# Patient Record
Sex: MALE | Race: WHITE | NOT HISPANIC OR LATINO | Employment: FULL TIME | URBAN - METROPOLITAN AREA
[De-identification: names, ages, dates, MRNs, and addresses within clinical notes are randomized per-mention and may not be internally consistent; named-entity substitution may affect disease eponyms.]

---

## 2017-02-28 ENCOUNTER — HOSPITAL ENCOUNTER (OUTPATIENT)
Dept: LAB | Facility: MEDICAL CENTER | Age: 62
End: 2017-02-28
Attending: SURGERY
Payer: COMMERCIAL

## 2017-02-28 LAB
ALBUMIN SERPL BCP-MCNC: 3.9 G/DL (ref 3.2–4.9)
ALBUMIN/GLOB SERPL: 1.1 G/DL
ALP SERPL-CCNC: 85 U/L (ref 30–99)
ALT SERPL-CCNC: 92 U/L (ref 2–50)
ANION GAP SERPL CALC-SCNC: 5 MMOL/L (ref 0–11.9)
AST SERPL-CCNC: 65 U/L (ref 12–45)
BILIRUB SERPL-MCNC: 0.5 MG/DL (ref 0.1–1.5)
BUN SERPL-MCNC: 13 MG/DL (ref 8–22)
CALCIUM SERPL-MCNC: 9.4 MG/DL (ref 8.5–10.5)
CHLORIDE SERPL-SCNC: 108 MMOL/L (ref 96–112)
CO2 SERPL-SCNC: 27 MMOL/L (ref 20–33)
CREAT SERPL-MCNC: 0.77 MG/DL (ref 0.5–1.4)
ERYTHROCYTE [DISTWIDTH] IN BLOOD BY AUTOMATED COUNT: 51.8 FL (ref 35.9–50)
GLOBULIN SER CALC-MCNC: 3.6 G/DL (ref 1.9–3.5)
GLUCOSE SERPL-MCNC: 71 MG/DL (ref 65–99)
HCT VFR BLD AUTO: 47.8 % (ref 42–52)
HGB BLD-MCNC: 15.6 G/DL (ref 14–18)
INR PPP: 1.04 (ref 0.87–1.13)
MCH RBC QN AUTO: 32.6 PG (ref 27–33)
MCHC RBC AUTO-ENTMCNC: 32.6 G/DL (ref 33.7–35.3)
MCV RBC AUTO: 99.8 FL (ref 81.4–97.8)
PLATELET # BLD AUTO: 140 K/UL (ref 164–446)
PMV BLD AUTO: 12.7 FL (ref 9–12.9)
POTASSIUM SERPL-SCNC: 4.3 MMOL/L (ref 3.6–5.5)
PROT SERPL-MCNC: 7.5 G/DL (ref 6–8.2)
PROTHROMBIN TIME: 13.9 SEC (ref 12–14.6)
RBC # BLD AUTO: 4.79 M/UL (ref 4.7–6.1)
SODIUM SERPL-SCNC: 140 MMOL/L (ref 135–145)
WBC # BLD AUTO: 3.5 K/UL (ref 4.8–10.8)

## 2017-02-28 PROCEDURE — 85027 COMPLETE CBC AUTOMATED: CPT

## 2017-02-28 PROCEDURE — 85610 PROTHROMBIN TIME: CPT

## 2017-02-28 PROCEDURE — 36415 COLL VENOUS BLD VENIPUNCTURE: CPT

## 2017-02-28 PROCEDURE — 82105 ALPHA-FETOPROTEIN SERUM: CPT

## 2017-02-28 PROCEDURE — 80053 COMPREHEN METABOLIC PANEL: CPT

## 2017-02-28 PROCEDURE — 86200 CCP ANTIBODY: CPT

## 2017-02-28 PROCEDURE — 82107 ALPHA-FETOPROTEIN L3: CPT

## 2017-03-02 LAB
AFP L3 MFR SERPL: 9.3 % (ref 0–9.9)
AFP SERPL-MCNC: 5 NG/ML (ref 0–15)
AFP-TM SERPL-MCNC: 4 NG/ML (ref 0–9)
CCP IGG SERPL-ACNC: 6 UNITS (ref 0–19)

## 2017-03-05 ENCOUNTER — HOSPITAL ENCOUNTER (OUTPATIENT)
Dept: RADIOLOGY | Facility: MEDICAL CENTER | Age: 62
End: 2017-03-05
Attending: SURGERY
Payer: COMMERCIAL

## 2017-03-05 DIAGNOSIS — C22.9 MALIGNANT NEOPLASM OF LIVER, NOT SPECIFIED AS PRIMARY OR SECONDARY (HCC): ICD-10-CM

## 2017-03-05 PROCEDURE — 700117 HCHG RX CONTRAST REV CODE 255: Performed by: SURGERY

## 2017-03-05 PROCEDURE — 74170 CT ABD WO CNTRST FLWD CNTRST: CPT

## 2017-03-05 RX ADMIN — IOHEXOL 100 ML: 350 INJECTION, SOLUTION INTRAVENOUS at 11:07

## 2017-04-03 ENCOUNTER — APPOINTMENT (OUTPATIENT)
Dept: ADMISSIONS | Facility: MEDICAL CENTER | Age: 62
End: 2017-04-03
Attending: SURGERY
Payer: COMMERCIAL

## 2017-04-13 ENCOUNTER — HOSPITAL ENCOUNTER (OUTPATIENT)
Facility: MEDICAL CENTER | Age: 62
End: 2017-04-13
Attending: SURGERY | Admitting: SURGERY
Payer: COMMERCIAL

## 2017-04-13 VITALS
OXYGEN SATURATION: 96 % | SYSTOLIC BLOOD PRESSURE: 108 MMHG | WEIGHT: 170.86 LBS | HEART RATE: 67 BPM | BODY MASS INDEX: 23.14 KG/M2 | HEIGHT: 72 IN | DIASTOLIC BLOOD PRESSURE: 77 MMHG | TEMPERATURE: 97.3 F | RESPIRATION RATE: 15 BRPM

## 2017-04-13 PROBLEM — C22.9 MALIGNANT NEOPLASM OF LIVER, NOT SPECIFIED AS PRIMARY OR SECONDARY (HCC): Status: ACTIVE | Noted: 2017-04-13

## 2017-04-13 LAB
ALBUMIN SERPL BCP-MCNC: 3.8 G/DL (ref 3.2–4.9)
ALBUMIN/GLOB SERPL: 1.1 G/DL
ALP SERPL-CCNC: 84 U/L (ref 30–99)
ALT SERPL-CCNC: 65 U/L (ref 2–50)
ANION GAP SERPL CALC-SCNC: 7 MMOL/L (ref 0–11.9)
APTT PPP: 28.6 SEC (ref 24.7–36)
AST SERPL-CCNC: 51 U/L (ref 12–45)
BASOPHILS # BLD AUTO: 0.5 % (ref 0–1.8)
BASOPHILS # BLD: 0.02 K/UL (ref 0–0.12)
BILIRUB SERPL-MCNC: 1.2 MG/DL (ref 0.1–1.5)
BUN SERPL-MCNC: 14 MG/DL (ref 8–22)
CALCIUM SERPL-MCNC: 9.7 MG/DL (ref 8.5–10.5)
CHLORIDE SERPL-SCNC: 109 MMOL/L (ref 96–112)
CO2 SERPL-SCNC: 21 MMOL/L (ref 20–33)
CREAT SERPL-MCNC: 0.78 MG/DL (ref 0.5–1.4)
EOSINOPHIL # BLD AUTO: 0.04 K/UL (ref 0–0.51)
EOSINOPHIL NFR BLD: 1 % (ref 0–6.9)
ERYTHROCYTE [DISTWIDTH] IN BLOOD BY AUTOMATED COUNT: 47.1 FL (ref 35.9–50)
GFR SERPL CREATININE-BSD FRML MDRD: >60 ML/MIN/1.73 M 2
GLOBULIN SER CALC-MCNC: 3.6 G/DL (ref 1.9–3.5)
GLUCOSE SERPL-MCNC: 86 MG/DL (ref 65–99)
HCT VFR BLD AUTO: 48 % (ref 42–52)
HGB BLD-MCNC: 16.3 G/DL (ref 14–18)
IMM GRANULOCYTES # BLD AUTO: 0.01 K/UL (ref 0–0.11)
IMM GRANULOCYTES NFR BLD AUTO: 0.2 % (ref 0–0.9)
INR PPP: 1.05 (ref 0.87–1.13)
LYMPHOCYTES # BLD AUTO: 0.72 K/UL (ref 1–4.8)
LYMPHOCYTES NFR BLD: 17.9 % (ref 22–41)
MCH RBC QN AUTO: 32.8 PG (ref 27–33)
MCHC RBC AUTO-ENTMCNC: 34 G/DL (ref 33.7–35.3)
MCV RBC AUTO: 96.6 FL (ref 81.4–97.8)
MONOCYTES # BLD AUTO: 0.48 K/UL (ref 0–0.85)
MONOCYTES NFR BLD AUTO: 11.9 % (ref 0–13.4)
NEUTROPHILS # BLD AUTO: 2.75 K/UL (ref 1.82–7.42)
NEUTROPHILS NFR BLD: 68.5 % (ref 44–72)
NRBC # BLD AUTO: 0 K/UL
NRBC BLD AUTO-RTO: 0 /100 WBC
PLATELET # BLD AUTO: 146 K/UL (ref 164–446)
PMV BLD AUTO: 11 FL (ref 9–12.9)
POTASSIUM SERPL-SCNC: 4.2 MMOL/L (ref 3.6–5.5)
PROT SERPL-MCNC: 7.4 G/DL (ref 6–8.2)
PROTHROMBIN TIME: 14 SEC (ref 12–14.6)
RBC # BLD AUTO: 4.97 M/UL (ref 4.7–6.1)
SODIUM SERPL-SCNC: 137 MMOL/L (ref 135–145)
WBC # BLD AUTO: 4 K/UL (ref 4.8–10.8)

## 2017-04-13 PROCEDURE — 85025 COMPLETE CBC W/AUTO DIFF WBC: CPT

## 2017-04-13 PROCEDURE — A6402 STERILE GAUZE <= 16 SQ IN: HCPCS | Performed by: SURGERY

## 2017-04-13 PROCEDURE — 160036 HCHG PACU - EA ADDL 30 MINS PHASE I: Performed by: SURGERY

## 2017-04-13 PROCEDURE — 501838 HCHG SUTURE GENERAL: Performed by: SURGERY

## 2017-04-13 PROCEDURE — 160048 HCHG OR STATISTICAL LEVEL 1-5: Performed by: SURGERY

## 2017-04-13 PROCEDURE — 110382 HCHG SHELL REV 271: Performed by: SURGERY

## 2017-04-13 PROCEDURE — 110371 HCHG SHELL REV 272: Performed by: SURGERY

## 2017-04-13 PROCEDURE — 160035 HCHG PACU - 1ST 60 MINS PHASE I: Performed by: SURGERY

## 2017-04-13 PROCEDURE — 700111 HCHG RX REV CODE 636 W/ 250 OVERRIDE (IP)

## 2017-04-13 PROCEDURE — 160002 HCHG RECOVERY MINUTES (STAT): Performed by: SURGERY

## 2017-04-13 PROCEDURE — 501445 HCHG STAPLER, SKIN DISP: Performed by: SURGERY

## 2017-04-13 PROCEDURE — A4606 OXYGEN PROBE USED W OXIMETER: HCPCS | Performed by: SURGERY

## 2017-04-13 PROCEDURE — 80053 COMPREHEN METABOLIC PANEL: CPT

## 2017-04-13 PROCEDURE — 160009 HCHG ANES TIME/MIN: Performed by: SURGERY

## 2017-04-13 PROCEDURE — 160046 HCHG PACU - 1ST 60 MINS PHASE II: Performed by: SURGERY

## 2017-04-13 PROCEDURE — 700102 HCHG RX REV CODE 250 W/ 637 OVERRIDE(OP)

## 2017-04-13 PROCEDURE — 502240 HCHG MISC OR SUPPLY RC 0272: Performed by: SURGERY

## 2017-04-13 PROCEDURE — A9270 NON-COVERED ITEM OR SERVICE: HCPCS

## 2017-04-13 PROCEDURE — 160029 HCHG SURGERY MINUTES - 1ST 30 MINS LEVEL 4: Performed by: SURGERY

## 2017-04-13 PROCEDURE — 85610 PROTHROMBIN TIME: CPT

## 2017-04-13 PROCEDURE — 501570 HCHG TROCAR, SEPARATOR: Performed by: SURGERY

## 2017-04-13 PROCEDURE — 160047 HCHG PACU  - EA ADDL 30 MINS PHASE II: Performed by: SURGERY

## 2017-04-13 PROCEDURE — 160025 RECOVERY II MINUTES (STATS): Performed by: SURGERY

## 2017-04-13 PROCEDURE — 85730 THROMBOPLASTIN TIME PARTIAL: CPT

## 2017-04-13 PROCEDURE — 700101 HCHG RX REV CODE 250

## 2017-04-13 PROCEDURE — 502571 HCHG PACK, LAP CHOLE: Performed by: SURGERY

## 2017-04-13 PROCEDURE — 160041 HCHG SURGERY MINUTES - EA ADDL 1 MIN LEVEL 4: Performed by: SURGERY

## 2017-04-13 PROCEDURE — 501571 HCHG TROCAR, SEPARATOR 12X100: Performed by: SURGERY

## 2017-04-13 RX ORDER — ONDANSETRON 4 MG/1
4 TABLET, FILM COATED ORAL EVERY 4 HOURS PRN
Qty: 20 TAB | Refills: 1 | Status: SHIPPED | OUTPATIENT
Start: 2017-04-13 | End: 2017-04-13

## 2017-04-13 RX ORDER — TRAMADOL HYDROCHLORIDE 50 MG/1
50-100 TABLET ORAL EVERY 4 HOURS PRN
Qty: 30 TAB | Refills: 0 | Status: SHIPPED | OUTPATIENT
Start: 2017-04-13 | End: 2017-08-07

## 2017-04-13 RX ORDER — ONDANSETRON 4 MG/1
4 TABLET, FILM COATED ORAL EVERY 4 HOURS PRN
Qty: 20 TAB | Refills: 1 | Status: SHIPPED | OUTPATIENT
Start: 2017-04-13 | End: 2017-08-07

## 2017-04-13 RX ORDER — MAGNESIUM HYDROXIDE 1200 MG/15ML
LIQUID ORAL
Status: DISCONTINUED | OUTPATIENT
Start: 2017-04-13 | End: 2017-04-13 | Stop reason: HOSPADM

## 2017-04-13 RX ORDER — TRAMADOL HYDROCHLORIDE 50 MG/1
50-100 TABLET ORAL EVERY 4 HOURS PRN
Qty: 30 TAB | Refills: 0 | Status: SHIPPED | OUTPATIENT
Start: 2017-04-13 | End: 2017-04-13

## 2017-04-13 RX ORDER — KETOROLAC TROMETHAMINE 10 MG/1
10 TABLET, FILM COATED ORAL EVERY 6 HOURS PRN
Qty: 30 TAB | Refills: 1 | Status: SHIPPED | OUTPATIENT
Start: 2017-04-13 | End: 2017-08-07

## 2017-04-13 RX ORDER — OXYCODONE HCL 5 MG/5 ML
SOLUTION, ORAL ORAL
Status: COMPLETED
Start: 2017-04-13 | End: 2017-04-13

## 2017-04-13 RX ORDER — IBUPROFEN 200 MG
400-600 TABLET ORAL EVERY 6 HOURS PRN
Status: ON HOLD | COMMUNITY
End: 2017-04-13

## 2017-04-13 RX ORDER — KETOROLAC TROMETHAMINE 10 MG/1
10 TABLET, FILM COATED ORAL EVERY 6 HOURS PRN
Qty: 30 TAB | Refills: 1 | Status: SHIPPED | OUTPATIENT
Start: 2017-04-13 | End: 2017-04-13

## 2017-04-13 RX ORDER — SODIUM CHLORIDE, SODIUM LACTATE, POTASSIUM CHLORIDE, CALCIUM CHLORIDE 600; 310; 30; 20 MG/100ML; MG/100ML; MG/100ML; MG/100ML
1000 INJECTION, SOLUTION INTRAVENOUS
Status: COMPLETED | OUTPATIENT
Start: 2017-04-13 | End: 2017-04-13

## 2017-04-13 RX ORDER — TRAMADOL HYDROCHLORIDE 50 MG/1
50 TABLET ORAL EVERY 4 HOURS PRN
Qty: 30 TAB | Refills: 0 | Status: SHIPPED | OUTPATIENT
Start: 2017-04-13 | End: 2017-04-13

## 2017-04-13 RX ORDER — BUPIVACAINE HYDROCHLORIDE AND EPINEPHRINE 5; 5 MG/ML; UG/ML
INJECTION, SOLUTION EPIDURAL; INTRACAUDAL; PERINEURAL
Status: DISCONTINUED | OUTPATIENT
Start: 2017-04-13 | End: 2017-04-13 | Stop reason: HOSPADM

## 2017-04-13 RX ORDER — HYDROMORPHONE HYDROCHLORIDE 2 MG/ML
INJECTION, SOLUTION INTRAMUSCULAR; INTRAVENOUS; SUBCUTANEOUS
Status: COMPLETED
Start: 2017-04-13 | End: 2017-04-13

## 2017-04-13 RX ADMIN — FENTANYL CITRATE 25 MCG: 50 INJECTION, SOLUTION INTRAMUSCULAR; INTRAVENOUS at 08:20

## 2017-04-13 RX ADMIN — FENTANYL CITRATE 50 MCG: 50 INJECTION, SOLUTION INTRAMUSCULAR; INTRAVENOUS at 08:10

## 2017-04-13 RX ADMIN — OXYCODONE HYDROCHLORIDE 5 MG: 5 SOLUTION ORAL at 08:30

## 2017-04-13 RX ADMIN — SODIUM CHLORIDE, SODIUM LACTATE, POTASSIUM CHLORIDE, CALCIUM CHLORIDE 1000 ML: 600; 310; 30; 20 INJECTION, SOLUTION INTRAVENOUS at 06:04

## 2017-04-13 ASSESSMENT — PAIN SCALES - GENERAL
PAINLEVEL_OUTOF10: 2
PAINLEVEL_OUTOF10: 2
PAINLEVEL_OUTOF10: 3
PAINLEVEL_OUTOF10: 3
PAINLEVEL_OUTOF10: 0
PAINLEVEL_OUTOF10: 3

## 2017-04-13 NOTE — IP AVS SNAPSHOT
Home Care Instructions                                                                                                                Name:Mauricio Fraire Brandamore  Medical Record Number:[id number]  CSN: [id number]    YOB: 1955   Age: 62 y.o.  Sex: male  HT:1.829 m (6') WT: 77.5 kg (170 lb 13.7 oz)          Admit Date: 4/13/2017     Discharge Date:   Today's Date: 4/13/2017  Attending Doctor:  Ba Coleman,*                  Allergies:  Review of patient's allergies indicates no known allergies.              Follow-up Information     1. Follow up with Ba Coleman M.D. In 4 weeks.    Specialties:  Surgery, Radiology    Contact information    [id number]  [facility] [address] [phone]          Discharge Instructions         ACTIVITY: Rest and take it easy for the first 24 hours.  A responsible adult is recommended to remain with you during that time.  It is normal to feel sleepy.  We encourage you to not do anything that requires balance, judgment or coordination.    MILD FLU-LIKE SYMPTOMS ARE NORMAL. YOU MAY EXPERIENCE GENERALIZED MUSCLE ACHES, THROAT IRRITATION, HEADACHE AND/OR SOME NAUSEA.    FOR 24 HOURS DO NOT:  Drive, operate machinery or run household appliances.  Drink beer or alcoholic beverages.   Make important decisions or sign legal documents.    SPECIAL INSTRUCTIONS:   Follow up with DR. Coleman in 4 weeks after repeat CT of liver  Follow up with office MA in 14 days for staple removal    DIET: To avoid nausea, slowly advance diet as tolerated, avoiding spicy or greasy foods for the first day.  Add more substantial food to your diet according to your physician's instructions.  INCREASE FLUIDS AND FIBER TO AVOID CONSTIPATION.    SURGICAL DRESSING/BATHING:   OK to shower in AM with dressings on  Remove dressing in 6 days    FOLLOW-UP APPOINTMENT:  A follow-up appointment should be arranged with your doctor; call to schedule.    You should CALL YOUR PHYSICIAN if  you develop:  Fever greater than 101 degrees F.  Pain not relieved by medication, or persistent nausea or vomiting.  Excessive bleeding (blood soaking through dressing) or unexpected drainage from the wound.  Extreme redness or swelling around the incision site, drainage of pus or foul smelling drainage.  Inability to urinate or empty your bladder within 8 hours.  Problems with breathing or chest pain.    You should call 911 if you develop problems with breathing or chest pain.  If you are unable to contact your doctor or surgical center, you should go to the nearest emergency room or urgent care center.  Physician's telephone #: [phone]    If any questions arise, call your doctor.  If your doctor is not available, please feel free to call the Surgical Center at [phone].  The Center is open Monday through Friday from 7AM to 7PM.  You can also call the [employer] Live Chat HOTLINE open 24 hours/day, 7 days/week and speak to a nurse at [phone], or toll free at [phone].    A registered nurse may call you a few days after your surgery to see how you are doing after your procedure.    MEDICATIONS: Resume taking daily medication.  Take prescribed pain medication with food.  If no medication is prescribed, you may take non-aspirin pain medication if needed.  PAIN MEDICATION CAN BE VERY CONSTIPATING.  Take a stool softener or laxative such as senokot, pericolace, or milk of magnesia if needed.    Prescription given for Zofran, Ultram, and Toradol.  Last pain medication given at 8:30 am.    If your physician has prescribed pain medication that includes Acetaminophen (Tylenol), do not take additional Acetaminophen (Tylenol) while taking the prescribed medication.    Depression / Suicide Risk    As you are discharged from this [facility] facility, it is important to learn how to keep safe from harming yourself.    Recognize the warning signs:  · Abrupt changes in personality, positive or negative- including  increase in energy   · Giving away possessions  · Change in eating patterns- significant weight changes-  positive or negative  · Change in sleeping patterns- unable to sleep or sleeping all the time   · Unwillingness or inability to communicate  · Depression  · Unusual sadness, discouragement and loneliness  · Talk of wanting to die  · Neglect of personal appearance   · Rebelliousness- reckless behavior  · Withdrawal from people/activities they love  · Confusion- inability to concentrate     If you or a loved one observes any of these behaviors or has concerns about self-harm, here's what you can do:  · Talk about it- your feelings and reasons for harming yourself  · Remove any means that you might use to hurt yourself (examples: pills, rope, extension cords, firearm)  · Get professional help from the community (Mental Health, Substance Abuse, psychological counseling)  · Do not be alone:Call your Safe Contact- someone whom you trust who will be there for you.  · Call your local CRISIS HOTLINE 933-8136 or 677-298-6900  · Call your local Children's Mobile Crisis Response Team [place] (854) 031-1207 or [employer] [place] [employer]  · Call the toll free National Suicide Prevention Hotlines   · National Suicide Prevention Lifeline 195-943-WSEJ (2805)  · National Hope Line Network 800-SUICIDE (810-7620)       Medication List      START taking these medications        Instructions    Morning Afternoon Evening Bedtime    ketorolac 10 MG Tabs   Commonly known as:  TORADOL        Take 1 Tab by mouth every 6 hours as needed for Mild Pain.   Dose:  10 mg                        ondansetron 4 MG Tabs tablet   Commonly known as:  ZOFRAN        Take 1 Tab by mouth every four hours as needed for Nausea/Vomiting.   Dose:  4 mg                        tramadol 50 MG Tabs   Commonly known as:  ULTRAM        Take 1-2 Tabs by mouth every four hours as needed.   Dose:   mg                          STOP taking these medications      ibuprofen 200 MG Tabs   Commonly known as:  MOTRIN                    Where to Get Your Medications      You can get these medications from any pharmacy     Bring a paper prescription for each of these medications    - ketorolac 10 MG Tabs  - ondansetron 4 MG Tabs tablet  - tramadol 50 MG Tabs            Medication Information     Above and/or attached are the medications your physician expects you to take upon discharge. Review all of your home medications and newly ordered medications with your doctor and/or pharmacist. Follow medication instructions as directed by your doctor and/or pharmacist. Please keep your medication list with you and share with your physician. Update the information when medications are discontinued, doses are changed, or new medications (including over-the-counter products) are added; and carry medication information at all times in the event of emergency situations.        Resources     Quit Smoking / Tobacco Use:    I understand the use of any tobacco products increases my chance of suffering from future heart disease or stroke and could cause other illnesses which may shorten my life. Quitting the use of tobacco products is the single most important thing I can do to improve my health. For further information on smoking / tobacco cessation call a Toll Free Quit Line at 1-685.313.9697 (*National [employer]) or 1-667.127.7249 (American Lung Association) or you can access the web based program at www.lungusa.org.    [place] Tobacco Users Help Line:  (828) 538-6743       Toll Free: 1-153.290.6085  Quit Tobacco Program [facility] (210)702-4406    Crisis Hotline:    [place] Crisis Hotline:  6-935-VGHJVWQ or 1-616.760.3386    [place] Crisis Hotline:    1-749.870.3306 or 472-356-8382    Discharge Survey:   Thank you for choosing [facility]. We hope we did everything we could to make your hospital stay a pleasant one. You may be receiving a survey and we would  appreciate your time and participation in answering the questions. Your input is very valuable to us in our efforts to improve our service to our patients and their families.            Signatures     My signature on this form indicates that:    1. I acknowledge receipt and understanding of these Home Care Instruction.  2. My questions regarding this information have been answered to my satisfaction.  3. I have formulated a plan with my discharge nurse to obtain my prescribed medications for home.    __________________________________      __________________________________                   Patient Signature                                 Guardian/Responsible Adult Signature      __________________________________                 __________       ________                       Nurse Signature                                               Date                 Time

## 2017-04-13 NOTE — OR NURSING
PRE OP COMPLETED, IV ESTABLISHED, REVIEWED WHAT TO EXPECT DURING SURG AND AFTER, LABS DRAWN AND SENT TO LAB

## 2017-04-13 NOTE — DISCHARGE INSTRUCTIONS
ACTIVITY: Rest and take it easy for the first 24 hours.  A responsible adult is recommended to remain with you during that time.  It is normal to feel sleepy.  We encourage you to not do anything that requires balance, judgment or coordination.    MILD FLU-LIKE SYMPTOMS ARE NORMAL. YOU MAY EXPERIENCE GENERALIZED MUSCLE ACHES, THROAT IRRITATION, HEADACHE AND/OR SOME NAUSEA.    FOR 24 HOURS DO NOT:  Drive, operate machinery or run household appliances.  Drink beer or alcoholic beverages.   Make important decisions or sign legal documents.    SPECIAL INSTRUCTIONS:   Follow up with DR. Coleman in 4 weeks after repeat CT of liver  Follow up with office MA in 14 days for staple removal    DIET: To avoid nausea, slowly advance diet as tolerated, avoiding spicy or greasy foods for the first day.  Add more substantial food to your diet according to your physician's instructions.  INCREASE FLUIDS AND FIBER TO AVOID CONSTIPATION.    SURGICAL DRESSING/BATHING:   OK to shower in AM with dressings on  Remove dressing in 6 days    FOLLOW-UP APPOINTMENT:  A follow-up appointment should be arranged with your doctor; call to schedule.    You should CALL YOUR PHYSICIAN if you develop:  Fever greater than 101 degrees F.  Pain not relieved by medication, or persistent nausea or vomiting.  Excessive bleeding (blood soaking through dressing) or unexpected drainage from the wound.  Extreme redness or swelling around the incision site, drainage of pus or foul smelling drainage.  Inability to urinate or empty your bladder within 8 hours.  Problems with breathing or chest pain.    You should call 911 if you develop problems with breathing or chest pain.  If you are unable to contact your doctor or surgical center, you should go to the nearest emergency room or urgent care center.  Physician's telephone #: [phone]    If any questions arise, call your doctor.  If your doctor is not available, please feel free to call the Surgical  Center at [phone].  The Center is open Monday through Friday from 7AM to 7PM.  You can also call the HEALTH HOTLINE open 24 hours/day, 7 days/week and speak to a nurse at [phone], or toll free at [phone].    A registered nurse may call you a few days after your surgery to see how you are doing after your procedure.    MEDICATIONS: Resume taking daily medication.  Take prescribed pain medication with food.  If no medication is prescribed, you may take non-aspirin pain medication if needed.  PAIN MEDICATION CAN BE VERY CONSTIPATING.  Take a stool softener or laxative such as senokot, pericolace, or milk of magnesia if needed.    Prescription given for Zofran, Ultram, and Toradol.  Last pain medication given at 8:30 am.    If your physician has prescribed pain medication that includes Acetaminophen (Tylenol), do not take additional Acetaminophen (Tylenol) while taking the prescribed medication.    Depression / Suicide Risk    As you are discharged from this [facility] facility, it is important to learn how to keep safe from harming yourself.    Recognize the warning signs:  · Abrupt changes in personality, positive or negative- including increase in energy   · Giving away possessions  · Change in eating patterns- significant weight changes-  positive or negative  · Change in sleeping patterns- unable to sleep or sleeping all the time   · Unwillingness or inability to communicate  · Depression  · Unusual sadness, discouragement and loneliness  · Talk of wanting to die  · Neglect of personal appearance   · Rebelliousness- reckless behavior  · Withdrawal from people/activities they love  · Confusion- inability to concentrate     If you or a loved one observes any of these behaviors or has concerns about self-harm, here's what you can do:  · Talk about it- your feelings and reasons for harming yourself  · Remove any means that you might use to hurt yourself (examples: pills, rope, extension cords,  firearm)  · Get professional help from the community (Mental Health, Substance Abuse, psychological counseling)  · Do not be alone:Call your Safe Contact- someone whom you trust who will be there for you.  · Call your local CRISIS HOTLINE 301-1215 or 735-666-0863  · Call your local Children's Mobile Crisis Response Team [place] (362) 077-0544 or www.[employer]  · Call the toll free National Suicide Prevention Hotlines   · National Suicide Prevention Lifeline 441-238-DVRM (5565)  · National Hope Line Network 800-SUICIDE (927-1977)

## 2017-04-13 NOTE — IP AVS SNAPSHOT
4/13/2017    Mauricio Fraire [place]  [address] [address]    Dear Maruicio:    [facility] wants to ensure your discharge home is safe and you or your loved ones have had all of your questions answered regarding your care after you leave the hospital.    Below is a list of resources and contact information should you have any questions regarding your hospital stay, follow-up instructions, or active medical symptoms.    Questions or Concerns Regarding… Contact   Medical Questions Related to Your Discharge  (7 days a week, 8am-5pm) Contact a Nurse Care Coordinator   [phone]   Medical Questions Not Related to Your Discharge  (24 hours a day / 7 days a week)  Contact the Nurse Health Line   [phone]    Medications or Discharge Instructions Refer to your discharge packet   or contact your [facility] Primary Care Provider   [phone]   Follow-up Appointment(s) Schedule your appointment via [employer]   or contact Scheduling [phone]   Billing Review your statement via [employer]  or contact Billing [phone]   Medical Records Review your records via [employer]   or contact Medical Records [phone]     You may receive a telephone call within two days of discharge. This call is to make certain you understand your discharge instructions and have the opportunity to have any questions answered. You can also easily access your medical information, test results and upcoming appointments via the [employer] free online health management tool. You can learn more and sign up at [employer]. For assistance setting up your [employer] account, please call [phone].    Once again, we want to ensure your discharge home is safe and that you have a clear understanding of any next steps in your care. If you have any questions or concerns, please do not hesitate to contact us, we are here for you. Thank you for choosing [facility] for your healthcare needs.    Sincerely,    Your [facility]  Healthcare Team

## 2017-04-13 NOTE — OP REPORT
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  Patient has been followed closely with surveillance and on recent   evaluation, he was found to have a new lesion in segment 6.  After long   discussion with the patient given risks, benefits, alternatives, he elected to   go ahead and proceed with repeat laparoscopic microwave thermal ablation.    SURGEON:  Ba Coleman MD.    ASSISTANT:  None.    ANESTHESIA:  General plus local anesthetic.    PROCEDURES DONE:  1.  Laparoscopic microwave thermal ablation of segment 5 lesion.  2.  Repair of umbilical hernia.    ESTIMATED BLOOD LOSS:  5 mL    COMPLICATIONS:  No complications.    DESCRIPTION OF PROCEDURE:  Patient was brought to OR, placed under general   anesthetic, prepped with chlorhexidine prep, covered with sterile drapes,   given preoperative antibiotics.  Time-out was adequate.  At that point, he was   given 5 mL of 0.5% Marcaine on the right lateral aspect of his umbilicus in   his previous laparoscopic incision.  An 11 blade was used to incise the skin   and Bovie electrocautery was used upon entering the abdomen.  Patient was   found to have an umbilical hernia.  The umbilical hernia was taken down with   Metzenbaum scissors.  Fascia was elevated and it was reduced into the abdomen.    We decided to use the same site for his port.  A 0 Vicryl suture was placed   on each side of the fascia in order to repair his ventral hernia, but also   hold a Gordo trocar.  Upon entering the abdomen, it was insufflated to 15 mm   of pressure.  He was placed in a reverse Trendelenburg, left side down.  His   old 5 mm port site was used again after given local anesthetic and the port   was placed under direct vision atraumatically.  At that point, we did   intraoperative ultrasound, we easily identified the surface  lesion that was   identified and _____ was found on CT scan.  Patient also showed a small liver,   but some macronodular and micronodular regenerative changes.  At that point,   the previous ablation site was evaluated with laparoscopic ultrasound, found   to be intact.  We then evaluated the tumor and measured approximately 3 cm in   size, which was identical to what was seen on imaging.  At that point, we made   a stab wound in the lung to the right of the midline in the subcostal margin.    After given local anesthetic, a laparoscopic Microwave thermal ablation was   inserted and we did 3 overlapping ablations in order to encompass the whole   tumor.  We started at the deep and worked our way anterior and lateral.  We   initially started with 100 man at 4 minutes ablation giving us a 3.6x4.1   followed by a 1-minute track ablation, we then proceeded with a second 100   man at 4 minutes giving us a 3.6x4.1 again and then a third 100 man at 4   minutes giving _____ overlapping, each was somewhere between 30 seconds and 1   minute track ablations.  Once completed, reinspection with the ultrasound, so   that we could not identify the tumor.  There was no bleeding.  We then placed   patient in the supine position.  At that point, we then proceeded with placing   in a supine position, desufflated the abdomen, waiting 3 minutes by the   clock, insufflated and reinspected showed no bile or bleeding.  So at that   point, the operation was completed.  The rest of the liver had been surveyed   and found to have no other tumors.  At that point, the operation was   completed.  The abdomen was desufflated.  All the trocars were removed under   direct vision, 0 Vicryl suture was tied in the umbilicus.  He was given   another 10 mL of 0.5% Marcaine with epinephrine throughout the area of the   umbilicus.  Skin was closed with staples, covered with 2x2, Tegaderm,   extubated, and transferred to recovery room.        ____________________________________     MD ALEJA DRAKE / YESENIA    DD:  04/13/2017 08:10:53  DT:  04/13/2017 08:56:30    D#:  [id number]  Job#:  [id number]

## 2017-04-13 NOTE — PROGRESS NOTES
The Medication Reconciliation process has been completed by interviewing the patient    Allergies have been reviewed  Antibiotic use in 30 days - NONE    Home Pharmacy:  NONE

## 2017-06-11 ENCOUNTER — HOSPITAL ENCOUNTER (OUTPATIENT)
Dept: RADIOLOGY | Facility: MEDICAL CENTER | Age: 62
End: 2017-06-11
Attending: SURGERY
Payer: COMMERCIAL

## 2017-06-11 DIAGNOSIS — C22.9 MALIGNANT NEOPLASM OF LIVER, NOT SPECIFIED AS PRIMARY OR SECONDARY (HCC): ICD-10-CM

## 2017-06-19 ENCOUNTER — HOSPITAL ENCOUNTER (OUTPATIENT)
Dept: RADIOLOGY | Facility: MEDICAL CENTER | Age: 62
End: 2017-06-19
Attending: SURGERY
Payer: COMMERCIAL

## 2017-06-19 PROCEDURE — 74170 CT ABD WO CNTRST FLWD CNTRST: CPT

## 2017-06-19 PROCEDURE — 700117 HCHG RX CONTRAST REV CODE 255: Performed by: SURGERY

## 2017-06-19 RX ADMIN — IOHEXOL 100 ML: 350 INJECTION, SOLUTION INTRAVENOUS at 13:04

## 2017-07-17 ENCOUNTER — HOSPITAL ENCOUNTER (OUTPATIENT)
Dept: LAB | Facility: MEDICAL CENTER | Age: 62
End: 2017-07-17
Attending: NURSE PRACTITIONER
Payer: COMMERCIAL

## 2017-07-17 DIAGNOSIS — K74.60 CIRRHOSIS OF LIVER WITHOUT ASCITES, UNSPECIFIED HEPATIC CIRRHOSIS TYPE (HCC): ICD-10-CM

## 2017-07-17 DIAGNOSIS — C22.0 HEPATOMA (HCC): ICD-10-CM

## 2017-07-17 LAB
ALBUMIN SERPL BCP-MCNC: 3.9 G/DL (ref 3.2–4.9)
ALBUMIN/GLOB SERPL: 1.3 G/DL
ALP SERPL-CCNC: 103 U/L (ref 30–99)
ALT SERPL-CCNC: 66 U/L (ref 2–50)
ANION GAP SERPL CALC-SCNC: 8 MMOL/L (ref 0–11.9)
AST SERPL-CCNC: 58 U/L (ref 12–45)
BASOPHILS # BLD AUTO: 0.4 % (ref 0–1.8)
BASOPHILS # BLD: 0.02 K/UL (ref 0–0.12)
BILIRUB SERPL-MCNC: 0.7 MG/DL (ref 0.1–1.5)
BUN SERPL-MCNC: 10 MG/DL (ref 8–22)
CALCIUM SERPL-MCNC: 9.8 MG/DL (ref 8.5–10.5)
CHLORIDE SERPL-SCNC: 107 MMOL/L (ref 96–112)
CO2 SERPL-SCNC: 25 MMOL/L (ref 20–33)
CREAT SERPL-MCNC: 0.76 MG/DL (ref 0.5–1.4)
EOSINOPHIL # BLD AUTO: 0.04 K/UL (ref 0–0.51)
EOSINOPHIL NFR BLD: 0.9 % (ref 0–6.9)
ERYTHROCYTE [DISTWIDTH] IN BLOOD BY AUTOMATED COUNT: 49.6 FL (ref 35.9–50)
GFR SERPL CREATININE-BSD FRML MDRD: >60 ML/MIN/1.73 M 2
GLOBULIN SER CALC-MCNC: 3.1 G/DL (ref 1.9–3.5)
GLUCOSE SERPL-MCNC: 97 MG/DL (ref 65–99)
HCT VFR BLD AUTO: 44.8 % (ref 42–52)
HGB BLD-MCNC: 14.9 G/DL (ref 14–18)
IMM GRANULOCYTES # BLD AUTO: 0.01 K/UL (ref 0–0.11)
IMM GRANULOCYTES NFR BLD AUTO: 0.2 % (ref 0–0.9)
INR PPP: 1.02 (ref 0.87–1.13)
LYMPHOCYTES # BLD AUTO: 0.64 K/UL (ref 1–4.8)
LYMPHOCYTES NFR BLD: 14.2 % (ref 22–41)
MCH RBC QN AUTO: 32.5 PG (ref 27–33)
MCHC RBC AUTO-ENTMCNC: 33.3 G/DL (ref 33.7–35.3)
MCV RBC AUTO: 97.8 FL (ref 81.4–97.8)
MONOCYTES # BLD AUTO: 0.52 K/UL (ref 0–0.85)
MONOCYTES NFR BLD AUTO: 11.5 % (ref 0–13.4)
NEUTROPHILS # BLD AUTO: 3.29 K/UL (ref 1.82–7.42)
NEUTROPHILS NFR BLD: 72.8 % (ref 44–72)
NRBC # BLD AUTO: 0 K/UL
NRBC BLD AUTO-RTO: 0 /100 WBC
PLATELET # BLD AUTO: 136 K/UL (ref 164–446)
PMV BLD AUTO: 12 FL (ref 9–12.9)
POTASSIUM SERPL-SCNC: 4.1 MMOL/L (ref 3.6–5.5)
PROT SERPL-MCNC: 7 G/DL (ref 6–8.2)
PROTHROMBIN TIME: 13.7 SEC (ref 12–14.6)
RBC # BLD AUTO: 4.58 M/UL (ref 4.7–6.1)
SODIUM SERPL-SCNC: 140 MMOL/L (ref 135–145)
WBC # BLD AUTO: 4.5 K/UL (ref 4.8–10.8)

## 2017-07-17 PROCEDURE — 85025 COMPLETE CBC W/AUTO DIFF WBC: CPT

## 2017-07-17 PROCEDURE — 83951 ONCOPROTEIN DCP: CPT

## 2017-07-17 PROCEDURE — 36415 COLL VENOUS BLD VENIPUNCTURE: CPT

## 2017-07-17 PROCEDURE — 82107 ALPHA-FETOPROTEIN L3: CPT

## 2017-07-17 PROCEDURE — 80053 COMPREHEN METABOLIC PANEL: CPT

## 2017-07-17 PROCEDURE — 85610 PROTHROMBIN TIME: CPT

## 2017-07-18 ENCOUNTER — HOSPITAL ENCOUNTER (OUTPATIENT)
Dept: RADIOLOGY | Facility: MEDICAL CENTER | Age: 62
End: 2017-07-18
Attending: SURGERY
Payer: COMMERCIAL

## 2017-07-18 DIAGNOSIS — C22.9 MALIGNANT NEOPLASM OF LIVER, NOT SPECIFIED AS PRIMARY OR SECONDARY (HCC): ICD-10-CM

## 2017-07-18 DIAGNOSIS — K74.60 CIRRHOSIS OF LIVER WITHOUT ASCITES, UNSPECIFIED HEPATIC CIRRHOSIS TYPE (HCC): ICD-10-CM

## 2017-07-18 DIAGNOSIS — C22.0 HEPATOMA (HCC): ICD-10-CM

## 2017-07-18 NOTE — CONSULTS
Interventional Oncology Consultation      Re: Mauricio Henderson     MRN: [id number]   : 1955    Mauricio Henderson was referred by Ba Coleman MD. He is a 62 y.o. male seen in clinic for evaluation and possible intervention of unresectable right lobe hepatocellular carcinoma. He does not have any additional treating physicians.    History of Present Illness:   presented to his local ED with acute abdominal pain and right shoulder pain on . 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 He was taken emergently to the OR on  for washout, ablation, and wedge resection of the exophytic portion of the hepatoma. He recovered and subsequently returned to the OR on  for microwave ablation of the remainder of the lesion in segment 5 as well as umbilical hernia repair. He underwent surveillance imaging which showed recurrence around the ablation site in the right lobe. Mr. Henderson is in otherwise good health and is under consideration for right hepatectomy, however he is not currently resectable due to the size of his left hepatic lobe. He denies complaints of fatigue, fever, jaundice, nausea or vomiting, or appetite or bowel changes. He denies ascites or abdominal swelling. He complains of heartburn mainly if he eats too late and then lays down in bed. He works as a  and lives in [place].    He is seen today for review of imaging studies and discussion of possible transarterial therapy with Y90 radioembolization and future portal vein embolization.     Past Medical History   Diagnosis Date   • Patient denies medical problems    • Heart burn    • Dental disorder      uppers     Past Surgical History   Procedure Laterality Date   • Other       esophageal varicies repaired   • Hepatic ablation laparoscopic  2016     Procedure: HEPATIC ABLATION LAPAROSCOPIC. LIVER WEDGE RESECTION;  Surgeon:  Ba Coleman M.D.;  Location: SURGERY [facility];  Service:    • Hepatic ablation laparoscopic Right 4/13/2017     Procedure: HEPATIC ABLATION LAPAROSCOPIC RIGHT LOBE SEGMENT 6 ;  Surgeon: Ba Coleman M.D.;  Location: SURGERY [facility];  Service:      Social History     Social History   • Marital Status: Single     Spouse Name: N/A   • Number of Children: N/A   • Years of Education: N/A     Occupational History   • Not on file.     Social History Main Topics   • Smoking status: Former Smoker -- 1.00 packs/day for 30 years     Types: Cigarettes     Quit date: 01/01/2001   • Smokeless tobacco: Never Used   • Alcohol Use: No   • Drug Use: No   • Sexual Activity: Not on file     Other Topics Concern   • Not on file     Social History Narrative     No family history on file.    Review of Systems:  Constitutional: negative for anorexia, fatigue, fevers and weight loss  Respiratory: negative for dyspnea on exertion  Gastrointestinal: positive for dyspepsia and heartburn, negative for abdominal pain, change in bowel habits, jaundice, melena, nausea and vomiting  Musculoskeletal:negative  Neurological: negative    Labs:      Ref. Range 4/13/2017 06:05 7/17/2017 16:21   WBC Latest Ref Range: 4.8-10.8 K/uL 4.0 (L) 4.5 (L)   RBC Latest Ref Range: 4.70-6.10 M/uL 4.97 4.58 (L)   Hemoglobin Latest Ref Range: 14.0-18.0 g/dL 16.3 14.9   Hematocrit Latest Ref Range: 42.0-52.0 % 48.0 44.8   MCV Latest Ref Range: 81.4-97.8 fL 96.6 97.8   MCH Latest Ref Range: 27.0-33.0 pg 32.8 32.5   MCHC Latest Ref Range: 33.7-35.3 g/dL 34.0 33.3 (L)   RDW Latest Ref Range: 35.9-50.0 fL 47.1 49.6   Platelet Count Latest Ref Range: 164-446 K/uL 146 (L) 136 (L)   MPV Latest Ref Range: 9.0-12.9 fL 11.0 12.0   Neutrophils-Polys Latest Ref Range: 44.00-72.00 % 68.50 72.80 (H)   Neutrophils (Absolute) Latest Ref Range: 1.82-7.42 K/uL 2.75 3.29   Lymphocytes Latest Ref Range: 22.00-41.00 % 17.90 (L) 14.20 (L)   Lymphs  (Absolute) Latest Ref Range: 1.00-4.80 K/uL 0.72 (L) 0.64 (L)   Monocytes Latest Ref Range: 0.00-13.40 % 11.90 11.50   Monos (Absolute) Latest Ref Range: 0.00-0.85 K/uL 0.48 0.52   Eosinophils Latest Ref Range: 0.00-6.90 % 1.00 0.90   Eos (Absolute) Latest Ref Range: 0.00-0.51 K/uL 0.04 0.04   Basophils Latest Ref Range: 0.00-1.80 % 0.50 0.40   Baso (Absolute) Latest Ref Range: 0.00-0.12 K/uL 0.02 0.02   Immature Granulocytes Latest Ref Range: 0.00-0.90 % 0.20 0.20   Immature Granulocytes (abs) Latest Ref Range: 0.00-0.11 K/uL 0.01 0.01   Nucleated RBC Latest Units: /100 WBC 0.00 0.00   NRBC (Absolute) Latest Units: K/uL 0.00 0.00   Sodium Latest Ref Range: 135-145 mmol/L 137 140   Potassium Latest Ref Range: 3.6-5.5 mmol/L 4.2 4.1   Chloride Latest Ref Range:  mmol/L 109 107   Co2 Latest Ref Range: 20-33 mmol/L 21 25   Anion Gap Latest Ref Range: 0.0-11.9  7.0 8.0   Glucose Latest Ref Range: 65-99 mg/dL 86 97   Bun Latest Ref Range: 8-22 mg/dL 14 10   Creatinine Latest Ref Range: 0.50-1.40 mg/dL 0.78 0.76   GFR If  Latest Ref Range: >60 mL/min/1.73 m 2 >60 >60   GFR If Non  Latest Ref Range: >60 mL/min/1.73 m 2 >60 >60   Calcium Latest Ref Range: 8.5-10.5 mg/dL 9.7 9.8   AST(SGOT) Latest Ref Range: 12-45 U/L 51 (H) 58 (H)   ALT(SGPT) Latest Ref Range: 2-50 U/L 65 (H) 66 (H)   Alkaline Phosphatase Latest Ref Range: 30-99 U/L 84 103 (H)   Total Bilirubin Latest Ref Range: 0.1-1.5 mg/dL 1.2 0.7   Albumin Latest Ref Range: 3.2-4.9 g/dL 3.8 3.9   Total Protein Latest Ref Range: 6.0-8.2 g/dL 7.4 7.0   Globulin Latest Ref Range: 1.9-3.5 g/dL 3.6 (H) 3.1   A-G Ratio Latest Units: g/dL 1.1 1.3   PT Latest Ref Range: 12.0-14.6 sec 14.0 13.7   INR Latest Ref Range: 0.87-1.13  1.05 1.02   APTT Latest Ref Range: 24.7-36.0 sec 28.6       Ref. Range 2/28/2017 10:03 2/28/2017 10:03   Alpha Fetoprotein Latest Ref Range: 0-9 ng/mL 5 4   AFP L3% Latest Ref Range: 0.0-9.9 % 9.3       Pathology:  [facility] February 19, 2017:  Hepatic tumor:         Multiple fragmented portions of a moderately differentiated          hepatocellular carcinoma.         The fragments of hepatocellular carcinoma measure 6.5 x 5.0 x          1.5 cm in aggregate dimension. There is focal associated          hemorrhage.         See synoptic report and comment below.    Radiology:   Review of imaging obtained at [facility]:  CT abdomen June 19, 2017:  1.  Interval development of new nodular arterial phase enhancement with washout along the superior, medial and inferior aspects of the previously ablated lesion in the right lobe of the liver. These findings are consistent with residual tumor.  2.  There are at least 2 separate subcentimeter focal areas of arterial enhancement in the dome of the liver suspicious for additional areas of tumor.  3.  Overall the ablation cavity is similar in size.  4.  There is mild splenomegaly again noted.      Current Outpatient Prescriptions   Medication Sig Dispense Refill   • ketorolac (TORADOL) 10 MG Tab Take 1 Tab by mouth every 6 hours as needed for Mild Pain. 30 Tab 1   • ondansetron (ZOFRAN) 4 MG Tab tablet Take 1 Tab by mouth every four hours as needed for Nausea/Vomiting. 20 Tab 1   • tramadol (ULTRAM) 50 MG Tab Take 1-2 Tabs by mouth every four hours as needed. 30 Tab 0     No current facility-administered medications for this encounter.       No Known Allergies    Physical Exam:  General Appearance: healthy, alert, no distress, cooperative  Lungs: negative findings: normal respiratory rate and rhythm  Abdomen: negative findings: no ascites noted  Ambulates greater than 100 feet independently with steady gait.  ECOG Performance Status 0    Impression:   1. Unresectable hepatocellular carcinoma to right hepatic lobe.  2. Cirrhosis.  3. Portal hypertension.  4. Heartburn.    Plan:   Alli Navas MD has reviewed 's history and imaging studies, examined the patient, and  discussed treatment options.  is a candidate for palliative transarterial radioembolization of unresectable right lobe hepatocellular carcinoma. The goal of his therapy is to treat his right lobe hepatocellular carcinoma and then embolize the right portal vein so that he may ultimately have adequate liver reserve for a right hepatectomy in the future. We discussed the method of the procedures at length including angiography and embolization as well as the expected clinical course with the possibility of post-embolization syndrome nausea and pain and hospitalization. We explained that our procedures are palliative and not curative. We discussed the possibility of tumor recurrence and development of future tumors. We additionally discussed the risks, including bleeding and infection, damage to the arteries, reaction to any medications given during the procedure, potential side effects of contrast administration including renal damage, non-target embolization, radiation-induced ulcers or liver disease, liver failure, and death. We discussed alternatives of the procedure including surveillance with no intervention and chemoembolization. The patient verbalizes understanding of risks and alternatives and elects to proceed. All questions were answered. Written pre- and post- procedure care instructions were provided.     The plan is as follows, pending insurance authorization:  · Start proton pump inhibitor.  · Mapping August 8.  · Y90 SIRT right lobe August 15.  · Clinic follow up with repeat labs 10-14 days post Y90.  · Right portal vein embolization approximately 4 weeks after Y90 procedure.  · Imaging and follow up per Dr. Coleman following IR procedures.    ARI Montanez with Alli Navas MD  Interventional Radiology   [facility]   [id number])  [place] [address]  [phone]

## 2017-07-20 LAB — ACARBOXYPROTHROMBIN SERPL-MCNC: 230 NG/ML (ref 0–7.4)

## 2017-07-21 LAB
AFP L3 MFR SERPL: 32.5 % (ref 0–9.9)
AFP SERPL-MCNC: 10 NG/ML (ref 0–15)

## 2017-08-07 ENCOUNTER — APPOINTMENT (OUTPATIENT)
Dept: ADMISSIONS | Facility: MEDICAL CENTER | Age: 62
End: 2017-08-07
Attending: RADIOLOGY
Payer: COMMERCIAL

## 2017-08-07 DIAGNOSIS — Z01.812 PRE-OPERATIVE LABORATORY EXAMINATION: ICD-10-CM

## 2017-08-07 LAB
ALBUMIN SERPL BCP-MCNC: 4 G/DL (ref 3.2–4.9)
ALBUMIN/GLOB SERPL: 1.3 G/DL
ALP SERPL-CCNC: 100 U/L (ref 30–99)
ALT SERPL-CCNC: 86 U/L (ref 2–50)
ANION GAP SERPL CALC-SCNC: 4 MMOL/L (ref 0–11.9)
AST SERPL-CCNC: 70 U/L (ref 12–45)
BASOPHILS # BLD AUTO: 0.4 % (ref 0–1.8)
BASOPHILS # BLD: 0.02 K/UL (ref 0–0.12)
BILIRUB SERPL-MCNC: 0.8 MG/DL (ref 0.1–1.5)
BUN SERPL-MCNC: 12 MG/DL (ref 8–22)
CALCIUM SERPL-MCNC: 9.9 MG/DL (ref 8.5–10.5)
CHLORIDE SERPL-SCNC: 105 MMOL/L (ref 96–112)
CO2 SERPL-SCNC: 27 MMOL/L (ref 20–33)
CREAT SERPL-MCNC: 0.76 MG/DL (ref 0.5–1.4)
EOSINOPHIL # BLD AUTO: 0.05 K/UL (ref 0–0.51)
EOSINOPHIL NFR BLD: 1.1 % (ref 0–6.9)
ERYTHROCYTE [DISTWIDTH] IN BLOOD BY AUTOMATED COUNT: 48.6 FL (ref 35.9–50)
GFR SERPL CREATININE-BSD FRML MDRD: >60 ML/MIN/1.73 M 2
GLOBULIN SER CALC-MCNC: 3.2 G/DL (ref 1.9–3.5)
GLUCOSE SERPL-MCNC: 76 MG/DL (ref 65–99)
HCT VFR BLD AUTO: 45.3 % (ref 42–52)
HGB BLD-MCNC: 15.3 G/DL (ref 14–18)
IMM GRANULOCYTES # BLD AUTO: 0.02 K/UL (ref 0–0.11)
IMM GRANULOCYTES NFR BLD AUTO: 0.4 % (ref 0–0.9)
INR PPP: 1.05 (ref 0.87–1.13)
LYMPHOCYTES # BLD AUTO: 0.72 K/UL (ref 1–4.8)
LYMPHOCYTES NFR BLD: 15.4 % (ref 22–41)
MCH RBC QN AUTO: 33.3 PG (ref 27–33)
MCHC RBC AUTO-ENTMCNC: 33.8 G/DL (ref 33.7–35.3)
MCV RBC AUTO: 98.7 FL (ref 81.4–97.8)
MONOCYTES # BLD AUTO: 0.56 K/UL (ref 0–0.85)
MONOCYTES NFR BLD AUTO: 12 % (ref 0–13.4)
NEUTROPHILS # BLD AUTO: 3.3 K/UL (ref 1.82–7.42)
NEUTROPHILS NFR BLD: 70.7 % (ref 44–72)
NRBC # BLD AUTO: 0 K/UL
NRBC BLD AUTO-RTO: 0 /100 WBC
PLATELET # BLD AUTO: 152 K/UL (ref 164–446)
PMV BLD AUTO: 12 FL (ref 9–12.9)
POTASSIUM SERPL-SCNC: 4.4 MMOL/L (ref 3.6–5.5)
PROT SERPL-MCNC: 7.2 G/DL (ref 6–8.2)
PROTHROMBIN TIME: 14 SEC (ref 12–14.6)
RBC # BLD AUTO: 4.59 M/UL (ref 4.7–6.1)
SODIUM SERPL-SCNC: 136 MMOL/L (ref 135–145)
WBC # BLD AUTO: 4.7 K/UL (ref 4.8–10.8)

## 2017-08-07 PROCEDURE — 85610 PROTHROMBIN TIME: CPT

## 2017-08-07 PROCEDURE — 80053 COMPREHEN METABOLIC PANEL: CPT

## 2017-08-07 PROCEDURE — 85025 COMPLETE CBC W/AUTO DIFF WBC: CPT

## 2017-08-07 PROCEDURE — 36415 COLL VENOUS BLD VENIPUNCTURE: CPT

## 2017-08-08 ENCOUNTER — APPOINTMENT (OUTPATIENT)
Dept: RADIOLOGY | Facility: MEDICAL CENTER | Age: 62
End: 2017-08-08
Attending: RADIOLOGY
Payer: COMMERCIAL

## 2017-08-08 ENCOUNTER — HOSPITAL ENCOUNTER (OUTPATIENT)
Facility: MEDICAL CENTER | Age: 62
End: 2017-08-08
Attending: RADIOLOGY | Admitting: RADIOLOGY
Payer: COMMERCIAL

## 2017-08-08 VITALS
RESPIRATION RATE: 16 BRPM | HEIGHT: 73 IN | DIASTOLIC BLOOD PRESSURE: 64 MMHG | WEIGHT: 160.05 LBS | SYSTOLIC BLOOD PRESSURE: 103 MMHG | OXYGEN SATURATION: 96 % | TEMPERATURE: 98 F | HEART RATE: 86 BPM | BODY MASS INDEX: 21.21 KG/M2

## 2017-08-08 DIAGNOSIS — C22.0 HEPATOMA (HCC): ICD-10-CM

## 2017-08-08 PROCEDURE — 700111 HCHG RX REV CODE 636 W/ 250 OVERRIDE (IP): Performed by: RADIOLOGY

## 2017-08-08 PROCEDURE — 75726 ARTERY X-RAYS ABDOMEN: CPT

## 2017-08-08 PROCEDURE — 99153 MOD SED SAME PHYS/QHP EA: CPT

## 2017-08-08 PROCEDURE — A9540 TC99M MAA: HCPCS

## 2017-08-08 PROCEDURE — 160002 HCHG RECOVERY MINUTES (STAT)

## 2017-08-08 PROCEDURE — 700111 HCHG RX REV CODE 636 W/ 250 OVERRIDE (IP)

## 2017-08-08 PROCEDURE — 76937 US GUIDE VASCULAR ACCESS: CPT

## 2017-08-08 PROCEDURE — 36245 INS CATH ABD/L-EXT ART 1ST: CPT

## 2017-08-08 PROCEDURE — 36246 INS CATH ABD/L-EXT ART 2ND: CPT

## 2017-08-08 RX ORDER — MIDAZOLAM HYDROCHLORIDE 1 MG/ML
.5-2 INJECTION INTRAMUSCULAR; INTRAVENOUS PRN
Status: ACTIVE | OUTPATIENT
Start: 2017-08-08 | End: 2017-08-08

## 2017-08-08 RX ORDER — HEPARIN SODIUM,PORCINE 1000/ML
VIAL (ML) INJECTION
Status: COMPLETED
Start: 2017-08-08 | End: 2017-08-08

## 2017-08-08 RX ORDER — VERAPAMIL HYDROCHLORIDE 2.5 MG/ML
2.5 INJECTION, SOLUTION INTRAVENOUS
Status: DISCONTINUED | OUTPATIENT
Start: 2017-08-08 | End: 2017-08-08 | Stop reason: HOSPADM

## 2017-08-08 RX ORDER — OXYCODONE HYDROCHLORIDE 5 MG/1
10 TABLET ORAL
Status: DISCONTINUED | OUTPATIENT
Start: 2017-08-08 | End: 2017-08-08 | Stop reason: HOSPADM

## 2017-08-08 RX ORDER — SODIUM CHLORIDE 9 MG/ML
INJECTION, SOLUTION INTRAVENOUS
Status: DISCONTINUED | OUTPATIENT
Start: 2017-08-08 | End: 2017-08-08 | Stop reason: HOSPADM

## 2017-08-08 RX ORDER — HEPARIN SODIUM 1000 [USP'U]/ML
5000 INJECTION, SOLUTION INTRAVENOUS; SUBCUTANEOUS ONCE
Status: COMPLETED | OUTPATIENT
Start: 2017-08-08 | End: 2017-08-08

## 2017-08-08 RX ORDER — SODIUM CHLORIDE 9 MG/ML
500 INJECTION, SOLUTION INTRAVENOUS
Status: ACTIVE | OUTPATIENT
Start: 2017-08-08 | End: 2017-08-08

## 2017-08-08 RX ORDER — VERAPAMIL HYDROCHLORIDE 2.5 MG/ML
INJECTION, SOLUTION INTRAVENOUS
Status: COMPLETED
Start: 2017-08-08 | End: 2017-08-08

## 2017-08-08 RX ORDER — OXYCODONE HYDROCHLORIDE 5 MG/1
5 TABLET ORAL
Status: DISCONTINUED | OUTPATIENT
Start: 2017-08-08 | End: 2017-08-08 | Stop reason: HOSPADM

## 2017-08-08 RX ORDER — MIDAZOLAM HYDROCHLORIDE 1 MG/ML
INJECTION INTRAMUSCULAR; INTRAVENOUS
Status: COMPLETED
Start: 2017-08-08 | End: 2017-08-08

## 2017-08-08 RX ORDER — ONDANSETRON 2 MG/ML
4 INJECTION INTRAMUSCULAR; INTRAVENOUS PRN
Status: ACTIVE | OUTPATIENT
Start: 2017-08-08 | End: 2017-08-08

## 2017-08-08 RX ORDER — ONDANSETRON 2 MG/ML
4 INJECTION INTRAMUSCULAR; INTRAVENOUS EVERY 8 HOURS PRN
Status: DISCONTINUED | OUTPATIENT
Start: 2017-08-08 | End: 2017-08-08 | Stop reason: HOSPADM

## 2017-08-08 RX ADMIN — FENTANYL CITRATE 50 MCG: 50 INJECTION, SOLUTION INTRAMUSCULAR; INTRAVENOUS at 14:22

## 2017-08-08 RX ADMIN — VERAPAMIL HYDROCHLORIDE 2.5 MG: 2.5 INJECTION, SOLUTION INTRAVENOUS at 14:30

## 2017-08-08 RX ADMIN — HEPARIN SODIUM 5000 UNITS: 1000 INJECTION, SOLUTION INTRAVENOUS; SUBCUTANEOUS at 14:30

## 2017-08-08 RX ADMIN — MIDAZOLAM 1 MG: 1 INJECTION INTRAMUSCULAR; INTRAVENOUS at 14:22

## 2017-08-08 RX ADMIN — SODIUM CHLORIDE: 9 INJECTION, SOLUTION INTRAVENOUS at 11:45

## 2017-08-08 RX ADMIN — NITROGLYCERIN 1 ML: 20 INJECTION INTRAVENOUS at 14:30

## 2017-08-08 RX ADMIN — MIDAZOLAM 1 MG: 1 INJECTION INTRAMUSCULAR; INTRAVENOUS at 14:38

## 2017-08-08 RX ADMIN — FENTANYL CITRATE 25 MCG: 50 INJECTION, SOLUTION INTRAMUSCULAR; INTRAVENOUS at 14:38

## 2017-08-08 ASSESSMENT — PAIN SCALES - GENERAL
PAINLEVEL_OUTOF10: 0

## 2017-08-08 NOTE — OR SURGEON
Immediate Post- Operative Note        PostOp Diagnosis: Multifocal HCC. Planned R lobe resection following Y90 and R PV embo at a later date,       Procedure(s): Pre Y90 visceral angiogram with MAA shunt study.       Estimated Blood Loss: Less than 5 ml        Complications: None            8/8/2017     1452 PM     Alli Navas

## 2017-08-08 NOTE — IP AVS SNAPSHOT
" Home Care Instructions                                                                                                                Name:Mauricio Fraire Cowen  Medical Record Number:[id number]  CSN: [id number]    YOB: 1955   Age: 62 y.o.  Sex: male  HT:1.854 m (6' 1\") WT: 72.6 kg (160 lb 0.9 oz)          Admit Date: 8/8/2017     Discharge Date:   Today's Date: 8/8/2017  Attending Doctor:  Alli Navas M.D.                  Allergies:  Review of patient's allergies indicates no known allergies.                Discharge Instructions         ACTIVITY: Rest and take it easy for the first 24 hours.  A responsible adult is recommended to remain with you during that time.  It is normal to feel sleepy.  We encourage you to not do anything that requires balance, judgment or coordination.    MILD FLU-LIKE SYMPTOMS ARE NORMAL. YOU MAY EXPERIENCE GENERALIZED MUSCLE ACHES, THROAT IRRITATION, HEADACHE AND/OR SOME NAUSEA.    FOR 24 HOURS DO NOT:  Drive, operate machinery or run household appliances.  Drink beer or alcoholic beverages.   Make important decisions or sign legal documents.    SPECIAL INSTRUCTIONS: LIMIT WRIST MOVEMENT FOR 48 HOURS.     DIET: To avoid nausea, slowly advance diet as tolerated, avoiding spicy or greasy foods for the first day.  Add more substantial food to your diet according to your physician's instructions.  Babies can be fed formula or breast milk as soon as they are hungry.  INCREASE FLUIDS AND FIBER TO AVOID CONSTIPATION.    SURGICAL DRESSING/BATHING: May remove dressing in 24 hours. May shower in 24 hours. Do not submerge site for 3 days.     FOLLOW-UP APPOINTMENT:  A follow-up appointment should be arranged with your doctor; call to schedule.    You should CALL YOUR PHYSICIAN if you develop:  Fever greater than 101 degrees F.  Pain not relieved by medication, or persistent nausea or vomiting.  Excessive bleeding (blood soaking through dressing) or unexpected drainage from the " wound.  Extreme redness or swelling around the incision site, drainage of pus or foul smelling drainage.  Inability to urinate or empty your bladder within 8 hours.  Problems with breathing or chest pain.    You should call 911 if you develop problems with breathing or chest pain.  If you are unable to contact your doctor or surgical center, you should go to the nearest emergency room or urgent care center.  Physician's telephone #: [phone]    If any questions arise, call your doctor.  If your doctor is not available, please feel free to call the Surgical Center at [phone].  The Center is open Monday through Friday from 7AM to 7PM.  You can also call the HEALTH HOTLINE open 24 hours/day, 7 days/week and speak to a nurse at [phone], or toll free at [phone].    A registered nurse may call you a few days after your surgery to see how you are doing after your procedure.    MEDICATIONS: Resume taking daily medication.  Take prescribed pain medication with food.  If no medication is prescribed, you may take non-aspirin pain medication if needed.  PAIN MEDICATION CAN BE VERY CONSTIPATING.  Take a stool softener or laxative such as senokot, pericolace, or milk of magnesia if needed.  If your physician has prescribed pain medication that includes Acetaminophen (Tylenol), do not take additional Acetaminophen (Tylenol) while taking the prescribed medication.    Depression / Suicide Risk    As you are discharged from this [facility] facility, it is important to learn how to keep safe from harming yourself.    Recognize the warning signs:  · Abrupt changes in personality, positive or negative- including increase in energy   · Giving away possessions  · Change in eating patterns- significant weight changes-  positive or negative  · Change in sleeping patterns- unable to sleep or sleeping all the time   · Unwillingness or inability to communicate  · Depression  · Unusual sadness, discouragement and  loneliness  · Talk of wanting to die  · Neglect of personal appearance   · Rebelliousness- reckless behavior  · Withdrawal from people/activities they love  · Confusion- inability to concentrate     If you or a loved one observes any of these behaviors or has concerns about self-harm, here's what you can do:  · Talk about it- your feelings and reasons for harming yourself  · Remove any means that you might use to hurt yourself (examples: pills, rope, extension cords, firearm)  · Get professional help from the community (Mental Health, Substance Abuse, psychological counseling)  · Do not be alone:Call your Safe Contact- someone whom you trust who will be there for you.  · Call your local CRISIS HOTLINE 587-9743 or 778-468-0533  · Call your local Children's Mobile Crisis Response Team [place] (713) 788-5135 or www.[employer]  · Call the toll free National Suicide Prevention Hotlines   · National Suicide Prevention Lifeline 741-009-GLGI (7258)  · [employer] Line Network 800-SUICIDE (353-0097)    Radial Catherization Discharge Instructions      · Do not subject hand/arm to any forceful movements for 24 hours    i.e. supporting weight when rising from the chair or bed.   · Do not drive a car for 24 hours  · You may remove the dressing tomorrow  · You may shower on the day following your procedure.  Do not take a tub bath or submerge the puncture site in water for 3 days following the procedure.  · No Lifting more than 3-5 pounds with affected wrist for 5 days  · Follow up with Dr. Navas  2-4 weeks.  · Increase fluids for 2 days post procedure.  · Continue all previous medications unless otherwise instructed.    If bleeding should occur following discharge:  · Sit down and apply firm pressure to site with your fingers for 10 minutes  · If the bleeding stops, continue to sit quietly, keeping your wrist straight for 2 hours.  Notify physician as soon as possible ( [phone])  · If bleeding does not stop  after 10 minutes, or if there is a large amount of bleeding or spurting, call 911 immediately.  Do not drive yourself to the hospital.       Medication List      Notice     You have not been prescribed any medications.            Medication Information     Above and/or attached are the medications your physician expects you to take upon discharge. Review all of your home medications and newly ordered medications with your doctor and/or pharmacist. Follow medication instructions as directed by your doctor and/or pharmacist. Please keep your medication list with you and share with your physician. Update the information when medications are discontinued, doses are changed, or new medications (including over-the-counter products) are added; and carry medication information at all times in the event of emergency situations.        Resources     Quit Smoking / Tobacco Use:    I understand the use of any tobacco products increases my chance of suffering from future heart disease or stroke and could cause other illnesses which may shorten my life. Quitting the use of tobacco products is the single most important thing I can do to improve my health. For further information on smoking / tobacco cessation call a Toll Free Quit Line at 1-572.735.8865 (*National Cancer [place]) or 1-920.412.7472 (American Lung Association) or you can access the web based program at www.lungusa.org.    [place] Tobacco Users Help Line:  (513) 818-5650       Toll Free: 1-337.280.8320  Quit Tobacco Program [facility] (630)044-9733    Crisis Hotline:    [place] Crisis Hotline:  0-659-JPYRKUJ or 1-669.349.9088    [place] Crisis Hotline:    1-944.122.6913 or 167-365-4781    Discharge Survey:   Thank you for choosing [facility]. We hope we did everything we could to make your hospital stay a pleasant one. You may be receiving a survey and we would appreciate your time and participation in answering the questions. Your input is  very valuable to us in our efforts to improve our service to our patients and their families.            Signatures     My signature on this form indicates that:    1. I acknowledge receipt and understanding of these Home Care Instruction.  2. My questions regarding this information have been answered to my satisfaction.  3. I have formulated a plan with my discharge nurse to obtain my prescribed medications for home.    __________________________________      __________________________________                   Patient Signature                                 Guardian/Responsible Adult Signature      __________________________________                 __________       ________                       Nurse Signature                                               Date                 Time

## 2017-08-08 NOTE — PROGRESS NOTES
IR Procedure Note:    Dr. Navas performed a Y90 Liver Mapping.  The pt tolerated the procedure well, vital signs stable; ETCo2 baseline 36, with consistent waveform during the procedure.  TR Band applied to left wrist, applied at 1445, starting 10cc, CDI and soft.  Report given to Caleb NUNEZ.  Pt transported to [facility] and then [facility].

## 2017-08-08 NOTE — DISCHARGE INSTRUCTIONS
ACTIVITY: Rest and take it easy for the first 24 hours.  A responsible adult is recommended to remain with you during that time.  It is normal to feel sleepy.  We encourage you to not do anything that requires balance, judgment or coordination.    MILD FLU-LIKE SYMPTOMS ARE NORMAL. YOU MAY EXPERIENCE GENERALIZED MUSCLE ACHES, THROAT IRRITATION, HEADACHE AND/OR SOME NAUSEA.    FOR 24 HOURS DO NOT:  Drive, operate machinery or run household appliances.  Drink beer or alcoholic beverages.   Make important decisions or sign legal documents.    SPECIAL INSTRUCTIONS: LIMIT WRIST MOVEMENT FOR 48 HOURS.     DIET: To avoid nausea, slowly advance diet as tolerated, avoiding spicy or greasy foods for the first day.  Add more substantial food to your diet according to your physician's instructions.  Babies can be fed formula or breast milk as soon as they are hungry.  INCREASE FLUIDS AND FIBER TO AVOID CONSTIPATION.    SURGICAL DRESSING/BATHING: May remove dressing in 24 hours. May shower in 24 hours. Do not submerge site for 3 days.     FOLLOW-UP APPOINTMENT:  A follow-up appointment should be arranged with your doctor; call to schedule.    You should CALL YOUR PHYSICIAN if you develop:  Fever greater than 101 degrees F.  Pain not relieved by medication, or persistent nausea or vomiting.  Excessive bleeding (blood soaking through dressing) or unexpected drainage from the wound.  Extreme redness or swelling around the incision site, drainage of pus or foul smelling drainage.  Inability to urinate or empty your bladder within 8 hours.  Problems with breathing or chest pain.    You should call 911 if you develop problems with breathing or chest pain.  If you are unable to contact your doctor or surgical center, you should go to the nearest emergency room or urgent care center.  Physician's telephone #: [phone]    If any questions arise, call your doctor.  If your doctor is not available, please feel free to call the Surgical  Center at [phone].  The Center is open Monday through Friday from 7AM to 7PM.  You can also call the HEALTH HOTLINE open 24 hours/day, 7 days/week and speak to a nurse at [phone], or toll free at [phone].    A registered nurse may call you a few days after your surgery to see how you are doing after your procedure.    MEDICATIONS: Resume taking daily medication.  Take prescribed pain medication with food.  If no medication is prescribed, you may take non-aspirin pain medication if needed.  PAIN MEDICATION CAN BE VERY CONSTIPATING.  Take a stool softener or laxative such as senokot, pericolace, or milk of magnesia if needed.  If your physician has prescribed pain medication that includes Acetaminophen (Tylenol), do not take additional Acetaminophen (Tylenol) while taking the prescribed medication.    Depression / Suicide Risk    As you are discharged from this [facility] facility, it is important to learn how to keep safe from harming yourself.    Recognize the warning signs:  · Abrupt changes in personality, positive or negative- including increase in energy   · Giving away possessions  · Change in eating patterns- significant weight changes-  positive or negative  · Change in sleeping patterns- unable to sleep or sleeping all the time   · Unwillingness or inability to communicate  · Depression  · Unusual sadness, discouragement and loneliness  · Talk of wanting to die  · Neglect of personal appearance   · Rebelliousness- reckless behavior  · Withdrawal from people/activities they love  · Confusion- inability to concentrate     If you or a loved one observes any of these behaviors or has concerns about self-harm, here's what you can do:  · Talk about it- your feelings and reasons for harming yourself  · Remove any means that you might use to hurt yourself (examples: pills, rope, extension cords, firearm)  · Get professional help from the community (Mental Health, Substance Abuse, psychological  counseling)  · Do not be alone:Call your Safe Contact- someone whom you trust who will be there for you.  · Call your local CRISIS HOTLINE 030-5908 or 819-005-4064  · Call your local Children's Mobile Crisis Response Team [place] (749) 332-2862 or www.[employer].Media  · Call the toll free National Suicide Prevention Hotlines   · National Suicide Prevention Lifeline 858-458-LQVB (1282)  · [employer] Line Network 800-SUICIDE (622-4378)    Radial Catherization Discharge Instructions      · Do not subject hand/arm to any forceful movements for 24 hours    i.e. supporting weight when rising from the chair or bed.   · Do not drive a car for 24 hours  · You may remove the dressing tomorrow  · You may shower on the day following your procedure.  Do not take a tub bath or submerge the puncture site in water for 3 days following the procedure.  · No Lifting more than 3-5 pounds with affected wrist for 5 days  · Follow up with Dr. Navas  2-4 weeks.  · Increase fluids for 2 days post procedure.  · Continue all previous medications unless otherwise instructed.    If bleeding should occur following discharge:  · Sit down and apply firm pressure to site with your fingers for 10 minutes  · If the bleeding stops, continue to sit quietly, keeping your wrist straight for 2 hours.  Notify physician as soon as possible ( [phone])  · If bleeding does not stop after 10 minutes, or if there is a large amount of bleeding or spurting, call 911 immediately.  Do not drive yourself to the hospital.

## 2017-08-08 NOTE — IP AVS SNAPSHOT
8/8/2017    Mauricio Fraire [place]  [address] [address]    Dear Mauricio:    [facility] wants to ensure your discharge home is safe and you or your loved ones have had all of your questions answered regarding your care after you leave the hospital.    Below is a list of resources and contact information should you have any questions regarding your hospital stay, follow-up instructions, or active medical symptoms.    Questions or Concerns Regarding… Contact   Medical Questions Related to Your Discharge  (7 days a week, 8am-5pm) Contact a Nurse Care Coordinator   [phone]   Medical Questions Not Related to Your Discharge  (24 hours a day / 7 days a week)  Contact the Nurse Health Line   [phone]    Medications or Discharge Instructions Refer to your discharge packet   or contact your [facility] Primary Care Provider   [phone]   Follow-up Appointment(s) Schedule your appointment via [employer]   or contact Scheduling [phone]   Billing Review your statement via [employer]  or contact Billing [phone]   Medical Records Review your records via [employer]   or contact Medical Records [phone]     You may receive a telephone call within two days of discharge. This call is to make certain you understand your discharge instructions and have the opportunity to have any questions answered. You can also easily access your medical information, test results and upcoming appointments via the [employer] free online health management tool. You can learn more and sign up at [employer]. For assistance setting up your [employer] account, please call [phone].    Once again, we want to ensure your discharge home is safe and that you have a clear understanding of any next steps in your care. If you have any questions or concerns, please do not hesitate to contact us, we are here for you. Thank you for choosing [facility] for your healthcare needs.    Sincerely,    Your [facility]  Healthcare Team

## 2017-08-08 NOTE — OR NURSING
PIV's attempted x7 by available pre-op RNs in PPU. All attempts unsuccessful. IR notified. IR RN to place access prior to procedure.

## 2017-08-09 NOTE — PROGRESS NOTES
Report received and Pt admitted to PPU 5 s/p Y 90 mapping. Left radial site soft non-tender with TR band in place. Site CDI with no s/s of bleeding or hematoma. Pt attached to all leads and monitors. VSS with no complaints of pain or nausea. Fluids and food offered. Orders reviewed.      Air removed from TR band Q15 min starting at 1500. Site monitored with no s/s of bleeding or hematoma.     1645: Up to bathroom with no issues noted. All air removed from TR band. TR band removed, site cleansed with gauze and tegaderm placed.     1700: Pt. Left radial  site CDI with no s/s of bleeding or hematoma. Pt. Meets discharge criteria. Pt. And responsible adult given discharge instructions. Pt. And responsible adult educated and all questions answered. Signed copy on chart. All lines and drains DC'd. Pt. Belongings with Pt and Pt. Transported via wheel chair to car with escort.

## 2017-08-15 ENCOUNTER — APPOINTMENT (OUTPATIENT)
Dept: RADIOLOGY | Facility: MEDICAL CENTER | Age: 62
End: 2017-08-15
Attending: RADIOLOGY
Payer: COMMERCIAL

## 2017-08-15 ENCOUNTER — HOSPITAL ENCOUNTER (OUTPATIENT)
Facility: MEDICAL CENTER | Age: 62
End: 2017-08-15
Attending: RADIOLOGY | Admitting: RADIOLOGY
Payer: COMMERCIAL

## 2017-08-15 VITALS
OXYGEN SATURATION: 94 % | HEIGHT: 72 IN | BODY MASS INDEX: 21.68 KG/M2 | WEIGHT: 160.05 LBS | HEART RATE: 80 BPM | DIASTOLIC BLOOD PRESSURE: 79 MMHG | SYSTOLIC BLOOD PRESSURE: 122 MMHG | RESPIRATION RATE: 18 BRPM | TEMPERATURE: 96.8 F

## 2017-08-15 DIAGNOSIS — C22.0 HEPATOCELLULAR CARCINOMA (HCC): ICD-10-CM

## 2017-08-15 DIAGNOSIS — C22.0 HEPATOMA (HCC): ICD-10-CM

## 2017-08-15 PROBLEM — D75.1: Status: ACTIVE | Noted: 2017-08-15

## 2017-08-15 PROCEDURE — 700111 HCHG RX REV CODE 636 W/ 250 OVERRIDE (IP): Performed by: RADIOLOGY

## 2017-08-15 PROCEDURE — 700105 HCHG RX REV CODE 258: Performed by: RADIOLOGY

## 2017-08-15 PROCEDURE — 76937 US GUIDE VASCULAR ACCESS: CPT

## 2017-08-15 PROCEDURE — 36246 INS CATH ABD/L-EXT ART 2ND: CPT

## 2017-08-15 PROCEDURE — 160002 HCHG RECOVERY MINUTES (STAT)

## 2017-08-15 PROCEDURE — 99153 MOD SED SAME PHYS/QHP EA: CPT

## 2017-08-15 PROCEDURE — 37243 VASC EMBOLIZE/OCCLUDE ORGAN: CPT

## 2017-08-15 PROCEDURE — 700111 HCHG RX REV CODE 636 W/ 250 OVERRIDE (IP)

## 2017-08-15 PROCEDURE — 78215 LVR&SPLEEN IMG STATIC ONLY: CPT

## 2017-08-15 RX ORDER — OXYCODONE HYDROCHLORIDE 5 MG/1
5 TABLET ORAL
Status: DISCONTINUED | OUTPATIENT
Start: 2017-08-15 | End: 2017-08-15 | Stop reason: HOSPADM

## 2017-08-15 RX ORDER — ONDANSETRON 2 MG/ML
4 INJECTION INTRAMUSCULAR; INTRAVENOUS EVERY 8 HOURS PRN
Status: DISCONTINUED | OUTPATIENT
Start: 2017-08-15 | End: 2017-08-15 | Stop reason: HOSPADM

## 2017-08-15 RX ORDER — MORPHINE SULFATE 4 MG/ML
4 INJECTION, SOLUTION INTRAMUSCULAR; INTRAVENOUS
Status: DISCONTINUED | OUTPATIENT
Start: 2017-08-15 | End: 2017-08-15 | Stop reason: HOSPADM

## 2017-08-15 RX ORDER — VERAPAMIL HYDROCHLORIDE 2.5 MG/ML
INJECTION, SOLUTION INTRAVENOUS
Status: COMPLETED
Start: 2017-08-15 | End: 2017-08-15

## 2017-08-15 RX ORDER — SODIUM CHLORIDE 9 MG/ML
500 INJECTION, SOLUTION INTRAVENOUS
Status: ACTIVE | OUTPATIENT
Start: 2017-08-15 | End: 2017-08-15

## 2017-08-15 RX ORDER — MIDAZOLAM HYDROCHLORIDE 1 MG/ML
.5-2 INJECTION INTRAMUSCULAR; INTRAVENOUS PRN
Status: ACTIVE | OUTPATIENT
Start: 2017-08-15 | End: 2017-08-15

## 2017-08-15 RX ORDER — ONDANSETRON 2 MG/ML
4 INJECTION INTRAMUSCULAR; INTRAVENOUS PRN
Status: ACTIVE | OUTPATIENT
Start: 2017-08-15 | End: 2017-08-15

## 2017-08-15 RX ORDER — HEPARIN SODIUM,PORCINE 1000/ML
VIAL (ML) INJECTION
Status: COMPLETED
Start: 2017-08-15 | End: 2017-08-15

## 2017-08-15 RX ORDER — SODIUM CHLORIDE 9 MG/ML
INJECTION, SOLUTION INTRAVENOUS
Status: DISCONTINUED | OUTPATIENT
Start: 2017-08-15 | End: 2017-08-15 | Stop reason: HOSPADM

## 2017-08-15 RX ORDER — OXYCODONE HYDROCHLORIDE 5 MG/1
10 TABLET ORAL
Status: DISCONTINUED | OUTPATIENT
Start: 2017-08-15 | End: 2017-08-15 | Stop reason: HOSPADM

## 2017-08-15 RX ORDER — MIDAZOLAM HYDROCHLORIDE 1 MG/ML
INJECTION INTRAMUSCULAR; INTRAVENOUS
Status: COMPLETED
Start: 2017-08-15 | End: 2017-08-15

## 2017-08-15 RX ADMIN — ONDANSETRON HYDROCHLORIDE 16 MG: 2 SOLUTION INTRAMUSCULAR; INTRAVENOUS at 08:27

## 2017-08-15 RX ADMIN — MIDAZOLAM HYDROCHLORIDE 1 MG: 1 INJECTION INTRAMUSCULAR; INTRAVENOUS at 09:24

## 2017-08-15 RX ADMIN — SODIUM CHLORIDE: 9 INJECTION, SOLUTION INTRAVENOUS at 09:30

## 2017-08-15 RX ADMIN — NITROGLYCERIN 100 MCG: 20 INJECTION INTRAVENOUS at 09:37

## 2017-08-15 RX ADMIN — MIDAZOLAM 1 MG: 1 INJECTION INTRAMUSCULAR; INTRAVENOUS at 09:24

## 2017-08-15 RX ADMIN — HEPARIN SODIUM 5000 UNITS: 1000 INJECTION, SOLUTION INTRAVENOUS; SUBCUTANEOUS at 09:31

## 2017-08-15 RX ADMIN — VERAPAMIL HYDROCHLORIDE 2.5 MG: 2.5 INJECTION, SOLUTION INTRAVENOUS at 09:37

## 2017-08-15 RX ADMIN — DEXAMETHASONE SODIUM PHOSPHATE 12 MG: 4 INJECTION, SOLUTION INTRAMUSCULAR; INTRAVENOUS at 08:15

## 2017-08-15 RX ADMIN — CEFTRIAXONE SODIUM 1 G: 1 INJECTION, POWDER, FOR SOLUTION INTRAMUSCULAR; INTRAVENOUS at 09:15

## 2017-08-15 RX ADMIN — FENTANYL CITRATE 50 MCG: 50 INJECTION, SOLUTION INTRAMUSCULAR; INTRAVENOUS at 09:24

## 2017-08-15 ASSESSMENT — PAIN SCALES - GENERAL
PAINLEVEL_OUTOF10: 0

## 2017-08-15 NOTE — OR NURSING
1025   PATIENT RECEIVED FROM IR,  S/P Y-90 VIA LEFT WRIST.  TR BAND INTACT,  SITE IS CLEAR.  PATIENT IS A/A/OX4.  DENIES ANY PAIN.  FAMILY IS AT BEDSIDE.    1100   NUCLEAR MEDICINE TECH IS HERE TO TALK TO PATIENT REGARDING RADIATION PRECAUTION.  JANE MARINELLI IS ALSO HERE TALKING TO PATIENT.      1130   1ST 2CC OF AIR RELEASED FROM TR BAND.  SITE IS CLEAR.    1145   NEXT 3CC OF AIR RELEASED FROM TR BAND.  SITE IS CLEAR.    1200  NEXT 3CC OF AIR RELEASED FROM TR BAND.  SITE IS CLEAR.    1215   NEXT 3CC OF AIR RELEASED FROM TR BAND.  SITE IS CLEAR.    1230  LAST 1CC OF AIR RELEASED FROM TR BAND.  SITE IS CLEAR.    1245   TR BAND REMOVED AND 2X2 AND TEGADERM APPLIED.    1300  DC INSTRUCTIONS GIVEN TO PATIENT AND FAMILY.  VERBALIZED UNDERSTANDING OF ALL.  HL DC.  PATIENT DC TO HOME,  AMBULATORY,  ESCORTED OUT BY CNA.

## 2017-08-15 NOTE — PROGRESS NOTES
IR Procedure Note:    Dr. Navas performed Y90 to right hepatic lobe.  The pt tolerated the procedure well, vital signs stable; ETCo2 baseline 39, with consistent waveform during the procedure and range betwenn 38-42.  Hemoband  applied to left radial access site with 13 cc air.  Report given to MAYRA Gardner.  Pt transported to PPU after nuc med scan.

## 2017-08-15 NOTE — IP AVS SNAPSHOT
Home Care Instructions                                                                                                                Name:Mauricio Fraire [place]  Medical Record Number:[id number]  CSN: [id number]    YOB: 1955   Age: 62 y.o.  Sex: male  HT:1.829 m (6') WT: 72.6 kg (160 lb 0.9 oz)          Admit Date: 8/15/2017     Discharge Date:   Today's Date: 8/15/2017  Attending Doctor:  Alli Vela M.D.                  Allergies:  Review of patient's allergies indicates no known allergies.                Discharge Instructions         ACTIVITY: Rest and take it easy for the first 24 hours.  A responsible adult is recommended to remain with you during that time.  It is normal to feel sleepy.  We encourage you to not do anything that requires balance, judgment or coordination.    MILD FLU-LIKE SYMPTOMS ARE NORMAL. YOU MAY EXPERIENCE GENERALIZED MUSCLE ACHES, THROAT IRRITATION, HEADACHE AND/OR SOME NAUSEA.    FOR 24 HOURS DO NOT:  Drive, operate machinery or run household appliances.  Drink beer or alcoholic beverages.   Make important decisions or sign legal documents.    SPECIAL INSTRUCTIONS: *KEEP INCISION DRY FOR 24 HRS**    DIET: To avoid nausea, slowly advance diet as tolerated, avoiding spicy or greasy foods for the first day.  Add more substantial food to your diet according to your physician's instructions.  Babies can be fed formula or breast milk as soon as they are hungry.  INCREASE FLUIDS AND FIBER TO AVOID CONSTIPATION.    SURGICAL DRESSING/BATHING: *MAY SHOWER TOMORROW AFTERNOON THEN MAY REMOVE DRESSING.  FOLLOW RADIATION PRECAUTION**    FOLLOW-UP APPOINTMENT:  A follow-up appointment should be arranged with your doctor in *[place] [facility]ELA   [phone] **; call to schedule.    You should CALL YOUR PHYSICIAN if you develop:  Fever greater than 101 degrees F.  Pain not relieved by medication, or persistent nausea or vomiting.  Excessive bleeding (blood soaking through dressing) or unexpected  drainage from the wound.  Extreme redness or swelling around the incision site, drainage of pus or foul smelling drainage.  Inability to urinate or empty your bladder within 8 hours.  Problems with breathing or chest pain.    You should call 911 if you develop problems with breathing or chest pain.  If you are unable to contact your doctor or surgical center, you should go to the nearest emergency room or urgent care center.  Physician's telephone #: *[phone]**    If any questions arise, call your doctor.  If your doctor is not available, please feel free to call the Surgical Center at [phone]  The Center is open Monday through Friday from 7AM to 7PM.  You can also call the HEALTH HOTLINE open 24 hours/day, 7 days/week and speak to a nurse at [phone], or toll free at [phone].    A registered nurse may call you a few days after your surgery to see how you are doing after your procedure.    MEDICATIONS: Resume taking daily medication.  Take prescribed pain medication with food.  If no medication is prescribed, you may take non-aspirin pain medication if needed.  PAIN MEDICATION CAN BE VERY CONSTIPATING.  Take a stool softener or laxative such as senokot, pericolace, or milk of magnesia if needed.      If your physician has prescribed pain medication that includes Acetaminophen (Tylenol), do not take additional Acetaminophen (Tylenol) while taking the prescribed medication.    Depression / Suicide Risk    As you are discharged from this [facility] facility, it is important to learn how to keep safe from harming yourself.    Recognize the warning signs:  · Abrupt changes in personality, positive or negative- including increase in energy   · Giving away possessions  · Change in eating patterns- significant weight changes-  positive or negative  · Change in sleeping patterns- unable to sleep or sleeping all the time   · Unwillingness or inability to communicate  · Depression  · Unusual sadness,  discouragement and loneliness  · Talk of wanting to die  · Neglect of personal appearance   · Rebelliousness- reckless behavior  · Withdrawal from people/activities they love  · Confusion- inability to concentrate     If you or a loved one observes any of these behaviors or has concerns about self-harm, here's what you can do:  · Talk about it- your feelings and reasons for harming yourself  · Remove any means that you might use to hurt yourself (examples: pills, rope, extension cords, firearm)  · Get professional help from the community (Mental Health, Substance Abuse, psychological counseling)  · Do not be alone:Call your Safe Contact- someone whom you trust who will be there for you.  · Call your local CRISIS HOTLINE 025-6956 or 613-312-2486  · Call your local Children's Mobile Crisis Response Team [place] (777) 469-1147 or www.[employer] [place]  · Call the toll free National Suicide Prevention Hotlines   · National Suicide Prevention Lifeline 413-423-MPSK (3118)  [place] [employer] Line Network 800-SUICIDE (817-8475)    Radial Catherization Discharge Instructions      · Do not subject hand/arm to any forceful movements for 24 hours    i.e. supporting weight when rising from the chair or bed.   · Do not drive a car for 24 hours  · You may remove the dressing tomorrow  · You may shower on the day following your procedure.  Do not take a tub bath or submerge the puncture site in water for 3 days following the procedure.  · No Lifting more than 3-5 pounds with affected wrist for 5 days  · Follow up with Dr. MUNGUIA**  2-4 weeks.  · Increase fluids for 2 days post procedure.  · Continue all previous medications unless otherwise instructed.    If bleeding should occur following discharge:  · Sit down and apply firm pressure to site with your fingers for 10 minutes  · If the bleeding stops, continue to sit quietly, keeping your wrist straight for 2 hours.  Notify physician as soon as possible ( [phone])  · If  bleeding does not stop after 10 minutes, or if there is a large amount of bleeding or spurting, call 911 immediately.  Do not drive yourself to the hospital.       Medication List      Notice     You have not been prescribed any medications.            Medication Information     Above and/or attached are the medications your physician expects you to take upon discharge. Review all of your home medications and newly ordered medications with your doctor and/or pharmacist. Follow medication instructions as directed by your doctor and/or pharmacist. Please keep your medication list with you and share with your physician. Update the information when medications are discontinued, doses are changed, or new medications (including over-the-counter products) are added; and carry medication information at all times in the event of emergency situations.        Resources     Quit Smoking / Tobacco Use:    I understand the use of any tobacco products increases my chance of suffering from future heart disease or stroke and could cause other illnesses which may shorten my life. Quitting the use of tobacco products is the single most important thing I can do to improve my health. For further information on smoking / tobacco cessation call a Toll Free Quit Line at 1-476.295.3956 (*[employer] [place]) or 1-276.433.6265 (American Lung Association) or you can access the web based program at www.lungusa.org.    [place] Tobacco Users Help Line:  (475) 146-2035       Toll Free: 1-993.347.9927  Quit Tobacco Program [facility] (473)685-3633    Crisis Hotline:    [place] Crisis Hotline:  0-504-HSGMDIN or 1-685.472.8789    [place] Crisis Hotline:    1-362.156.6239 or 246-954-6302    Discharge Survey:   Thank you for choosing [facility]. We hope we did everything we could to make your hospital stay a pleasant one. You may be receiving a survey and we would appreciate your time and participation in answering the  questions. Your input is very valuable to us in our efforts to improve our service to our patients and their families.            Signatures     My signature on this form indicates that:    1. I acknowledge receipt and understanding of these Home Care Instruction.  2. My questions regarding this information have been answered to my satisfaction.  3. I have formulated a plan with my discharge nurse to obtain my prescribed medications for home.    __________________________________      __________________________________                   Patient Signature                                 Guardian/Responsible Adult Signature      __________________________________                 __________       ________                       Nurse Signature                                               Date                 Time

## 2017-08-15 NOTE — DISCHARGE INSTRUCTIONS
ACTIVITY: Rest and take it easy for the first 24 hours.  A responsible adult is recommended to remain with you during that time.  It is normal to feel sleepy.  We encourage you to not do anything that requires balance, judgment or coordination.    MILD FLU-LIKE SYMPTOMS ARE NORMAL. YOU MAY EXPERIENCE GENERALIZED MUSCLE ACHES, THROAT IRRITATION, HEADACHE AND/OR SOME NAUSEA.    FOR 24 HOURS DO NOT:  Drive, operate machinery or run household appliances.  Drink beer or alcoholic beverages.   Make important decisions or sign legal documents.    SPECIAL INSTRUCTIONS: *KEEP INCISION DRY FOR 24 HRS**    DIET: To avoid nausea, slowly advance diet as tolerated, avoiding spicy or greasy foods for the first day.  Add more substantial food to your diet according to your physician's instructions.  Babies can be fed formula or breast milk as soon as they are hungry.  INCREASE FLUIDS AND FIBER TO AVOID CONSTIPATION.    SURGICAL DRESSING/BATHING: *MAY SHOWER TOMORROW AFTERNOON THEN MAY REMOVE DRESSING.  FOLLOW RADIATION PRECAUTION**    FOLLOW-UP APPOINTMENT:  A follow-up appointment should be arranged with your doctor in *DR VELA   [phone] **; call to schedule.    You should CALL YOUR PHYSICIAN if you develop:  Fever greater than 101 degrees F.  Pain not relieved by medication, or persistent nausea or vomiting.  Excessive bleeding (blood soaking through dressing) or unexpected drainage from the wound.  Extreme redness or swelling around the incision site, drainage of pus or foul smelling drainage.  Inability to urinate or empty your bladder within 8 hours.  Problems with breathing or chest pain.    You should call 911 if you develop problems with breathing or chest pain.  If you are unable to contact your doctor or surgical center, you should go to the nearest emergency room or urgent care center.  Physician's telephone #: *[phone]**    If any questions arise, call your doctor.  If your doctor is not available, please feel  free to call the Surgical Center at [phone]  The Center is open Monday through Friday from 7AM to 7PM.  You can also call the HEALTH HOTLINE open 24 hours/day, 7 days/week and speak to a nurse at [phone], or toll free at [phone].    A registered nurse may call you a few days after your surgery to see how you are doing after your procedure.    MEDICATIONS: Resume taking daily medication.  Take prescribed pain medication with food.  If no medication is prescribed, you may take non-aspirin pain medication if needed.  PAIN MEDICATION CAN BE VERY CONSTIPATING.  Take a stool softener or laxative such as senokot, pericolace, or milk of magnesia if needed.      If your physician has prescribed pain medication that includes Acetaminophen (Tylenol), do not take additional Acetaminophen (Tylenol) while taking the prescribed medication.    Depression / Suicide Risk    As you are discharged from this [facility] facility, it is important to learn how to keep safe from harming yourself.    Recognize the warning signs:  · Abrupt changes in personality, positive or negative- including increase in energy   · Giving away possessions  · Change in eating patterns- significant weight changes-  positive or negative  · Change in sleeping patterns- unable to sleep or sleeping all the time   · Unwillingness or inability to communicate  · Depression  · Unusual sadness, discouragement and loneliness  · Talk of wanting to die  · Neglect of personal appearance   · Rebelliousness- reckless behavior  · Withdrawal from people/activities they love  · Confusion- inability to concentrate     If you or a loved one observes any of these behaviors or has concerns about self-harm, here's what you can do:  · Talk about it- your feelings and reasons for harming yourself  · Remove any means that you might use to hurt yourself (examples: pills, rope, extension cords, firearm)  · Get professional help from the community (Mental Health,  Substance Abuse, psychological counseling)  · Do not be alone:Call your Safe Contact- someone whom you trust who will be there for you.  · Call your local CRISIS HOTLINE 809-6659 or 667-773-4408  · Call your local Children's Mobile Crisis Response Team [place] (735) 701-1180 or www.uConnect  · Call the toll free National Suicide Prevention Hotlines   · National Suicide Prevention Lifeline 573-124-ELTD (0616)  [facility] 800-SUICIDE (371-6023)    Radial Catherization Discharge Instructions      · Do not subject hand/arm to any forceful movements for 24 hours    i.e. supporting weight when rising from the chair or bed.   · Do not drive a car for 24 hours  · You may remove the dressing tomorrow  · You may shower on the day following your procedure.  Do not take a tub bath or submerge the puncture site in water for 3 days following the procedure.  · No Lifting more than 3-5 pounds with affected wrist for 5 days  · Follow up with Dr. MUNGUIA**  2-4 weeks.  · Increase fluids for 2 days post procedure.  · Continue all previous medications unless otherwise instructed.    If bleeding should occur following discharge:  · Sit down and apply firm pressure to site with your fingers for 10 minutes  · If the bleeding stops, continue to sit quietly, keeping your wrist straight for 2 hours.  Notify physician as soon as possible ( [phone])  · If bleeding does not stop after 10 minutes, or if there is a large amount of bleeding or spurting, call 911 immediately.  Do not drive yourself to the hospital.

## 2017-08-15 NOTE — OR SURGEON
Immediate Post- Operative Note        PostOp Diagnosis: HCC.       Procedure(s): R lobe Y90.      Estimated Blood Loss: Less than 5 ml        Complications: None            8/15/2017     1010 AM     Alli Navas

## 2017-08-15 NOTE — PROGRESS NOTES
Pt seen post procedure, denies complaints. Wants to go home today.    RX with patient:  Ondansetron 4 mg ODT, po q8hr prn post procedure n/v  #20 no RF  Oxycodone 5 mg po 1-2 q4 hr prn post procedure pain #30 no RF  Medrol dose pack take as directed on pack for post procedure inflammation disp one, no RF, start tomorrow.  Pt reminded to remain on proton pump inhibitor for 3 months after Y90 SIRT. He states he tried Nexium but it caused stomach pain and is not planning to take it.    Radial artery access education per [facility] nursing staff.    Follow up in IR clinic in approximately 2 weeks, we will make appt. Pt to get labs drawn ~2 days prior. Labs ordered in [employer] and can be accessed electronically by Justice HERNÁNDEZ if needed.

## 2017-08-15 NOTE — IP AVS SNAPSHOT
8/15/2017    Mauricio Fraire Henderson  [address] [facility] [address]    Dear Mauricio:    [facility] wants to ensure your discharge home is safe and you or your loved ones have had all of your questions answered regarding your care after you leave the hospital.    Below is a list of resources and contact information should you have any questions regarding your hospital stay, follow-up instructions, or active medical symptoms.    Questions or Concerns Regarding… Contact   Medical Questions Related to Your Discharge  (7 days a week, 8am-5pm) Contact a Nurse Care Coordinator   [phone]   Medical Questions Not Related to Your Discharge  (24 hours a day / 7 days a week)  Contact the Nurse Health Line   [phone]    Medications or Discharge Instructions Refer to your discharge packet   or contact your [facility] Primary Care Provider   [phone]   Follow-up Appointment(s) Schedule your appointment via [employer]   or contact Scheduling [phone]   Billing Review your statement via [employer]  or contact Billing [phone]   Medical Records Review your records via [employer]   or contact Medical Records [phone]     You may receive a telephone call within two days of discharge. This call is to make certain you understand your discharge instructions and have the opportunity to have any questions answered. You can also easily access your medical information, test results and upcoming appointments via the [employer] free online health management tool. You can learn more and sign up at [email or web address]. For assistance setting up your [employer] account, please call [phone].    Once again, we want to ensure your discharge home is safe and that you have a clear understanding of any next steps in your care. If you have any questions or concerns, please do not hesitate to contact us, we are here for you. Thank you for choosing [facility] for your healthcare needs.    Sincerely,    Your [facility]  Healthcare Team

## 2017-08-18 NOTE — ADDENDUM NOTE
Encounter addended by: Jazmín Mckenzie on: 8/18/2017  8:17 AM<BR>     Documentation filed: Charges VN

## 2017-08-23 ENCOUNTER — HOSPITAL ENCOUNTER (OUTPATIENT)
Dept: LAB | Facility: MEDICAL CENTER | Age: 62
End: 2017-08-23
Attending: NURSE PRACTITIONER
Payer: COMMERCIAL

## 2017-08-23 DIAGNOSIS — C22.0 HEPATOCELLULAR CARCINOMA (HCC): ICD-10-CM

## 2017-08-23 LAB
ALBUMIN SERPL BCP-MCNC: 4 G/DL (ref 3.2–4.9)
ALBUMIN/GLOB SERPL: 1.2 G/DL
ALP SERPL-CCNC: 97 U/L (ref 30–99)
ALT SERPL-CCNC: 102 U/L (ref 2–50)
ANION GAP SERPL CALC-SCNC: 7 MMOL/L (ref 0–11.9)
AST SERPL-CCNC: 78 U/L (ref 12–45)
BASOPHILS # BLD AUTO: 0.7 % (ref 0–1.8)
BASOPHILS # BLD: 0.04 K/UL (ref 0–0.12)
BILIRUB SERPL-MCNC: 1.5 MG/DL (ref 0.1–1.5)
BUN SERPL-MCNC: 15 MG/DL (ref 8–22)
CALCIUM SERPL-MCNC: 9.8 MG/DL (ref 8.5–10.5)
CHLORIDE SERPL-SCNC: 100 MMOL/L (ref 96–112)
CO2 SERPL-SCNC: 26 MMOL/L (ref 20–33)
CREAT SERPL-MCNC: 0.88 MG/DL (ref 0.5–1.4)
EOSINOPHIL # BLD AUTO: 0.04 K/UL (ref 0–0.51)
EOSINOPHIL NFR BLD: 0.7 % (ref 0–6.9)
ERYTHROCYTE [DISTWIDTH] IN BLOOD BY AUTOMATED COUNT: 51.4 FL (ref 35.9–50)
GFR SERPL CREATININE-BSD FRML MDRD: >60 ML/MIN/1.73 M 2
GLOBULIN SER CALC-MCNC: 3.4 G/DL (ref 1.9–3.5)
GLUCOSE SERPL-MCNC: 108 MG/DL (ref 65–99)
HCT VFR BLD AUTO: 46.3 % (ref 42–52)
HGB BLD-MCNC: 15.5 G/DL (ref 14–18)
IMM GRANULOCYTES # BLD AUTO: 0.14 K/UL (ref 0–0.11)
IMM GRANULOCYTES NFR BLD AUTO: 2.5 % (ref 0–0.9)
INR PPP: 0.99 (ref 0.87–1.13)
LYMPHOCYTES # BLD AUTO: 0.28 K/UL (ref 1–4.8)
LYMPHOCYTES NFR BLD: 5.1 % (ref 22–41)
MCH RBC QN AUTO: 33 PG (ref 27–33)
MCHC RBC AUTO-ENTMCNC: 33.5 G/DL (ref 33.7–35.3)
MCV RBC AUTO: 98.7 FL (ref 81.4–97.8)
MONOCYTES # BLD AUTO: 0.66 K/UL (ref 0–0.85)
MONOCYTES NFR BLD AUTO: 12 % (ref 0–13.4)
NEUTROPHILS # BLD AUTO: 4.36 K/UL (ref 1.82–7.42)
NEUTROPHILS NFR BLD: 79 % (ref 44–72)
NRBC # BLD AUTO: 0 K/UL
NRBC BLD AUTO-RTO: 0 /100 WBC
PLATELET # BLD AUTO: 123 K/UL (ref 164–446)
PMV BLD AUTO: 12.9 FL (ref 9–12.9)
POTASSIUM SERPL-SCNC: 4.1 MMOL/L (ref 3.6–5.5)
PROT SERPL-MCNC: 7.4 G/DL (ref 6–8.2)
PROTHROMBIN TIME: 13.4 SEC (ref 12–14.6)
RBC # BLD AUTO: 4.69 M/UL (ref 4.7–6.1)
SODIUM SERPL-SCNC: 133 MMOL/L (ref 135–145)
WBC # BLD AUTO: 5.5 K/UL (ref 4.8–10.8)

## 2017-08-23 PROCEDURE — 80053 COMPREHEN METABOLIC PANEL: CPT

## 2017-08-23 PROCEDURE — 36415 COLL VENOUS BLD VENIPUNCTURE: CPT

## 2017-08-23 PROCEDURE — 85025 COMPLETE CBC W/AUTO DIFF WBC: CPT

## 2017-08-23 PROCEDURE — 85610 PROTHROMBIN TIME: CPT

## 2017-08-24 ENCOUNTER — TELEPHONE (OUTPATIENT)
Dept: RADIOLOGY | Facility: MEDICAL CENTER | Age: 62
End: 2017-08-24

## 2017-08-24 DIAGNOSIS — C22.0 HEPATOMA (HCC): ICD-10-CM

## 2017-08-24 NOTE — TELEPHONE ENCOUNTER
Labs reviewed with Dr. Navas. There has been a mild increase in total bilirubin, now 1.5. This is expected after the procedure. Will repeat CMP in approx 2 weeks and see pt in clinic for evaluation prior to planned PVE. Results relayed to pt.

## 2017-09-08 ENCOUNTER — TELEPHONE (OUTPATIENT)
Dept: RADIOLOGY | Facility: MEDICAL CENTER | Age: 62
End: 2017-09-08

## 2017-09-08 DIAGNOSIS — C22.0 HEPATOCELLULAR CARCINOMA (HCC): ICD-10-CM

## 2017-09-08 NOTE — TELEPHONE ENCOUNTER
Pt contacted APRN about post procedure abdominal pain, which began earlier this week. Feels like burning pain in his upper right quadrant, eating makes the pain better. Reports nausea on an empty stomach. Denies fever. Pt states he is unable to take Nexium due to SE of headache. Tried ondansetron but it gave him a headache. Will try heartburn medications. Pt agrees to seek emergency care for fever or intractable abdominal pain but indicates he does not think this complaint is serious enough at this time. He has IR after hours contact number for questions.

## 2017-09-18 ENCOUNTER — HOSPITAL ENCOUNTER (OUTPATIENT)
Dept: RADIOLOGY | Facility: MEDICAL CENTER | Age: 62
End: 2017-09-18
Attending: NURSE PRACTITIONER

## 2017-09-18 DIAGNOSIS — C22.0 HEPATOMA (HCC): ICD-10-CM

## 2017-09-18 NOTE — CONSULTS
Interventional Radiology Follow Up     Re: Mauricio Henderson     MRN: [id number]   : 1955    Mauricio Henderson was seen today in follow up after right hepatic radioembolization performed by Alli Navas MD at [facility] on August 15, 2017.  He was referred to our service by Ba Coleman MD.    History of Present Illness:   presented to his local ED with acute abdominal pain and right shoulder pain on . 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 He was taken emergently to the OR on  for washout, ablation, and wedge resection of the exophytic portion of the hepatoma. He recovered and subsequently returned to the OR on  for microwave ablation of the remainder of the lesion in segment 5 as well as umbilical hernia repair. He underwent surveillance imaging which showed recurrence around the ablation site in the right lobe. He was determined to potentially be a candidate for right hepatectomy, however he is not currently resectable due to the small size of his left hepatic lobe. He was referred to Alli Navas MD for a multistage process that involved right lobe Y90 SIRT to treat the right hepatic disease to be followed by portal vein embolization for left lobe hypertrophy. He completed the right lobe Y90 SIRT on August 15 and returns today for follow up and procedure planning. After the Y90 procedure, he complained of burning abdominal pain in his epigastric region. He was intolerant of PPIs and tried Pepcid, which has resolved his discomfort. His 2 week post procedure labs showed a mild elevation in his total bilirubin trend that is now resolving on repeat labs. Today he denies complaints of fatigue, fever, jaundice, nausea or vomiting, or appetite or bowel changes. He denies ascites or abdominal swelling. He was provided oxycodone for post Y90 SIRT pain and states he took it a couple of times but did not  like the side effects, mainly sedation and sluggishness. He states he tolerated tramadol after his liver ablation procedures and requested that medication for post procedure pain management in the future. He is back to work as a  in [place]. He has not yet followed up with Dr. Coleman.     He is seen today in follow up after right lobe Y90 SIRT and to plan right portal vein embolization prior to hepatectomy.    Past Medical History:   Diagnosis Date   • Dental disorder     uppers   • Heart burn    • Hiatus hernia syndrome    • Patient denies medical problems      Past Surgical History:   Procedure Laterality Date   • HEPATIC ABLATION LAPAROSCOPIC Right 4/13/2017    Procedure: HEPATIC ABLATION LAPAROSCOPIC RIGHT LOBE SEGMENT 6 ;  Surgeon: Ba Coleman M.D.;  Location: SURGERY [facility];  Service:    • HEPATIC ABLATION LAPAROSCOPIC  2/19/2016    Procedure: HEPATIC ABLATION LAPAROSCOPIC. LIVER WEDGE RESECTION;  Surgeon: Ba Coleman M.D.;  Location: SURGERY [facility];  Service:    • OTHER      esophageal varicies repaired     Social History     Social History   • Marital status: Single     Spouse name: N/A   • Number of children: N/A   • Years of education: N/A     Occupational History   • Not on file.     Social History Main Topics   • Smoking status: Former Smoker     Packs/day: 1.00     Years: 30.00     Types: Cigarettes     Quit date: 1/1/2001   • Smokeless tobacco: Never Used   • Alcohol use No   • Drug use: No   • Sexual activity: Not on file     Other Topics Concern   • Not on file     Social History Narrative   • No narrative on file     No family history on file.    Review of Systems:  A comprehensive review of systems was negative except for: Gastrointestinal: positive for heartburn    A comprehensive 14-point review of systems was negative except as described above.     Labs:    Ref. Range 7/17/2017 16:21 8/7/2017 13:59 8/23/2017 14:49 9/14/2017 13:00    WBC Latest Ref Range: 4.8 - 10.8 K/uL 4.5 (L) 4.7 (L) 5.5 3.5 (L)   RBC Latest Ref Range: 4.70 - 6.10 M/uL 4.58 (L) 4.59 (L) 4.69 (L) 4.11 (L)   Hemoglobin Latest Ref Range: 14.0 - 18.0 g/dL 14.9 15.3 15.5 13.9 (L)   Hematocrit Latest Ref Range: 42.0 - 52.0 % 44.8 45.3 46.3 40.6 (L)   MCV Latest Ref Range: 80.0 - 94.0 fL 97.8 98.7 (H) 98.7 (H) 98.8 (H)   MCH Latest Ref Range: 28.7 - 33.1 pg 32.5 33.3 (H) 33.0 33.8 (H)   MCHC Latest Ref Range: 33.0 - 37.0 g/dL 33.3 (L) 33.8 33.5 (L) 34.2   RDW Latest Ref Range: 11.5 - 14.5 % 49.6 48.6 51.4 (H) 14.1   Platelet Count Latest Ref Range: 130 - 400 K/uL 136 (L) 152 (L) 123 (L) 146   MPV Latest Ref Range: 7.4 - 10.4 fL 12.0 12.0 12.9 11.8 (H)   Neutrophils-Polys Latest Ref Range: 39 - 70 % 72.80 (H) 70.70 79.00 (H) 61   Neutrophils Automated Latest Ref Range: 39.0 - 70.0 %    71.5 (H)   Abs Neutrophils Automated Latest Ref Range: 1.8 - 7.7 K/uL    2.5   Neutrophils (Absolute) Latest Ref Range: 1.8 - 7.7 K/uL 3.29 3.30 4.36 2.3   Bands-Stabs Latest Ref Range: 0 - 6 %    6   Lymphocytes Latest Ref Range: 21 - 50 % 14.20 (L) 15.40 (L) 5.10 (L) 17 (L)   Lymphocytes Automated Latest Ref Range: 21.0 - 50.0 %    11.1 (L)   Abs Lymph Automated Latest Ref Range: 1.2 - 4.8 K/uL    0.4 (L)   Lymphs (Absolute) Latest Ref Range: 1.00 - 4.80 K/uL 0.64 (L) 0.72 (L) 0.28 (L)    Monocytes Latest Ref Range: 2 - 9 % 11.50 12.00 12.00 13 (H)   Monos (Absolute) Latest Ref Range: 0.2 - 0.9 K/uL 0.52 0.56 0.66 0.6   Eosinophils Latest Ref Range: 0 - 5 % 0.90 1.10 0.70 3   Eosinophils Automated Latest Ref Range: 0.0 - 5.0 %    0.9   Eos (Absolute) Latest Ref Range: 0.00 - 0.51 K/uL 0.04 0.05 0.04    Basophils Latest Ref Range: 0.00 - 1.80 % 0.40 0.40 0.70    Basophils Automated Latest Ref Range: 0.0 - 3.0 %    0.3   Baso (Absolute) Latest Ref Range: 0.00 - 0.12 K/uL 0.02 0.02 0.04    Immature Granulocytes Latest Ref Range: 0.00 - 0.90 % 0.20 0.40 2.50 (H)    Immature Granulocytes (abs) Latest  Ref Range: 0.00 - 0.11 K/uL 0.01 0.02 0.14 (H)    Nucleated RBC Latest Units: /100 WBC 0.00 0.00 0.00    NRBC (Absolute) Latest Units: K/uL 0.00 0.00 0.00    Monocytes Automated Latest Ref Range: 1.7 - 9.3 %    16.2 (H)   Sodium Latest Ref Range: 136 - 145 mmol/L 140 136 133 (L) 137   Potassium Latest Ref Range: 3.5 - 5.1 mmol/L 4.1 4.4 4.1 4.1   Chloride Latest Ref Range: 98 - 107 mmol/L 107 105 100 104   Co2 Latest Ref Range: 20 - 29 mmol/L 25 27 26 26   Anion Gap Latest Ref Range: 10 - 18 mmol/L 8.0 4.0 7.0 11   Glucose Latest Ref Range: 70 - 100 mg/dL 97 76 108 (H) 84   Bun Latest Ref Range: 9 - 25 mg/dL 10 12 15 11   Creatinine Latest Ref Range: 0.7 - 1.3 mg/dL 0.76 0.76 0.88 0.9   GFR If  Latest Ref Range: >60 mL/min/1.73 m 2 >60 >60 >60 >60   GFR If Non  Latest Ref Range: >60 mL/min/1.73 m 2 >60 >60 >60 >60   Calcium Latest Ref Range: 8.5 - 10.1 mg/dL 9.8 9.9 9.8 9.0   AST(SGOT) Latest Ref Range: 5 - 37 U/L 58 (H) 70 (H) 78 (H) 217 (H)   ALT(SGPT) Latest Ref Range: 12 - 78 U/L 66 (H) 86 (H) 102 (H) 187 (H)   Alkaline Phosphatase Latest Ref Range: 34 - 120 U/L 103 (H) 100 (H) 97 133 (H)   Total Bilirubin Latest Ref Range: 0.1 - 1.2 mg/dL 0.7 0.8 1.5 1.2   Albumin Latest Ref Range: 3.5 - 5.0 g/dL 3.9 4.0 4.0 3.5   Total Protein Latest Ref Range: 6.4 - 8.3 g/dL 7.0 7.2 7.4 7.3   Globulin Latest Ref Range: 1.9 - 3.5 g/dL 3.1 3.2 3.4    A-G Ratio Unknown 1.3 1.3 1.2 0.9   PT Latest Ref Range: 12.0 - 14.6 sec 13.7 14.0 13.4    INR Latest Ref Range: 0.87 - 1.13  1.02 1.05 0.99    Georgi-gamma-carboxy Prothrombin Latest Ref Range: 0.0 - 7.4 ng/mL 230.0 (H)      Alpha Fetoprotein Latest Ref Range: 0 - 15 ng/mL 10      AFP L3% Latest Ref Range: 0.0 - 9.9 % 32.5 (H)          Pathology:  [facility] February 19, 2017:  Hepatic tumor:         Multiple fragmented portions of a moderately differentiated          hepatocellular carcinoma.         The fragments of hepatocellular carcinoma measure 6.5 x  5.0 x          1.5 cm in aggregate dimension. There is focal associated          hemorrhage.         See synoptic report and comment below.     Radiology:   Review of imaging obtained at [facility]:  Interventional Radiology procedure (right lobe Y90 SIRT) August 15, 2017:  1.  Technically successful Y90 radioembolization of the right hepatic lobe. The patient will followup in approximately 10 days for laboratory and clinical evaluation and 4-6 weeks for imaging evaluation.  2.  Immediately following the procedure, the patient was transported to nuclear medicine for planar imaging which demonstrates Bremsstrahlung emission from the right of the liver. There is no definite evidence of extrahepatic deposition of radioembolic   material.    Nuclear medicine Bremsstrahlung study August 15, 2017:  The prescribed dose was  0.93 gb ( 25.1 mCi). The drawn dose was 1 gb (27mCi). Delivered dose after residual was measured at 0.94 gb ( 25.5 mCi). This represents  102% of the prescribed dose and  95% of the drawn dose.  Bremsstrahlung images obtained   after the Y-90 radioembolization, confirm proper delivery to the targeted region. There is no uptake outside the planned treatment area. NOTE: The patient will be followed by interventional radiology and oncology.    Interventional Radiology procedure (mapping) August 8, 2017:  1.  Superior mesenteric arteriogram demonstrating a replaced right hepatic artery.  2.  Celiac arteriogram.  3.  Demonstrating no evidence of right hepatic feeding vessels and otherwise standard celiac axis vessels.  4.  Intra-arterial administration of 2.2 mCi technetium 99m labeled MAA into the hepatic artery demonstrated a hepatic pulmonary shunt fraction of 10%.    CT abdomen June 19, 2017:  1.  Interval development of new nodular arterial phase enhancement with washout along the superior, medial and inferior aspects of the previously ablated lesion in the right lobe of the liver. These findings are  consistent with residual tumor.  2.  There are at least 2 separate subcentimeter focal areas of arterial enhancement in the dome of the liver suspicious for additional areas of tumor.  3.  Overall the ablation cavity is similar in size.  4.  There is mild splenomegaly again noted.      No current outpatient prescriptions on file.     No current facility-administered medications for this encounter.        No Known Allergies    Physical Exam:  General Appearance: healthy, alert, no distress, cooperative  Lungs: negative findings: normal respiratory rate and rhythm  Abdomen: negative findings: no ascites noted  Ambulates greater than 100 feet independently with steady gait.  ECOG Performance Status 0    Impression:   1. Unresectable hepatocellular carcinoma to right hepatic lobe, status post right lobe Y90 SIRT.  2. Cirrhosis.  3. Portal hypertension.  4. Heartburn.    Plan:   Alli Navas MD has reviewed 's history and imaging studies, examined the patient, and discussed treatment options. Mr. Henderson has recovered well from the right lobe Y90 SIRT and is ready to move forward with right portal vein embolization. We discussed the method of the procedure at length and reviewed imaging studies. All questions were answered. We additionally discussed the procedure risks, including bleeding, infection, non target embolization, post embolization syndrome requiring hospitalization, liver failure, and death. He understands his risks and elects to proceed. He has been scheduled on October 5. We will repeat labs prior to the procedure. He will be placed on a course of post procedure oral antibiotics. He has requested that if pain medication be prescribed, he did not like the side effects of oxycodone and has requested tramadol or Tylenol with codeine instead. After the procedure, he should follow up with Dr. Coleman within a month for imaging and surgical planning.       ARI Montanez with Alli Navas,  MD  Interventional Radiology   [address] ([address])  [place] [address]  [phone]

## 2017-10-03 ENCOUNTER — APPOINTMENT (OUTPATIENT)
Dept: ADMISSIONS | Facility: MEDICAL CENTER | Age: 62
End: 2017-10-03
Attending: RADIOLOGY
Payer: COMMERCIAL

## 2017-10-03 RX ORDER — IBUPROFEN 200 MG
200 TABLET ORAL EVERY 6 HOURS PRN
COMMUNITY

## 2017-10-03 RX ORDER — FAMOTIDINE 20 MG/1
20 TABLET, FILM COATED ORAL 2 TIMES DAILY PRN
COMMUNITY

## 2017-10-05 ENCOUNTER — HOSPITAL ENCOUNTER (OUTPATIENT)
Facility: MEDICAL CENTER | Age: 62
End: 2017-10-05
Attending: RADIOLOGY | Admitting: RADIOLOGY
Payer: COMMERCIAL

## 2017-10-05 ENCOUNTER — APPOINTMENT (OUTPATIENT)
Dept: RADIOLOGY | Facility: MEDICAL CENTER | Age: 62
End: 2017-10-05
Attending: RADIOLOGY
Payer: COMMERCIAL

## 2017-10-05 VITALS
SYSTOLIC BLOOD PRESSURE: 134 MMHG | HEIGHT: 71 IN | WEIGHT: 159.83 LBS | TEMPERATURE: 97.3 F | OXYGEN SATURATION: 99 % | DIASTOLIC BLOOD PRESSURE: 84 MMHG | HEART RATE: 67 BPM | RESPIRATION RATE: 16 BRPM | BODY MASS INDEX: 22.38 KG/M2

## 2017-10-05 DIAGNOSIS — C22.0 HEPATOMA (HCC): ICD-10-CM

## 2017-10-05 PROCEDURE — 160002 HCHG RECOVERY MINUTES (STAT)

## 2017-10-05 PROCEDURE — A9270 NON-COVERED ITEM OR SERVICE: HCPCS

## 2017-10-05 PROCEDURE — 700111 HCHG RX REV CODE 636 W/ 250 OVERRIDE (IP)

## 2017-10-05 PROCEDURE — 36481 INSERTION OF CATHETER VEIN: CPT

## 2017-10-05 PROCEDURE — 75887 VEIN X-RAY LIVER W/O HEMODYN: CPT

## 2017-10-05 PROCEDURE — 700111 HCHG RX REV CODE 636 W/ 250 OVERRIDE (IP): Performed by: RADIOLOGY

## 2017-10-05 PROCEDURE — 700102 HCHG RX REV CODE 250 W/ 637 OVERRIDE(OP)

## 2017-10-05 RX ORDER — MIDAZOLAM HYDROCHLORIDE 1 MG/ML
INJECTION INTRAMUSCULAR; INTRAVENOUS
Status: COMPLETED
Start: 2017-10-05 | End: 2017-10-05

## 2017-10-05 RX ORDER — SODIUM CHLORIDE 9 MG/ML
INJECTION, SOLUTION INTRAVENOUS
Status: DISCONTINUED | OUTPATIENT
Start: 2017-10-05 | End: 2017-10-05 | Stop reason: HOSPADM

## 2017-10-05 RX ORDER — ONDANSETRON 2 MG/ML
4 INJECTION INTRAMUSCULAR; INTRAVENOUS PRN
Status: ACTIVE | OUTPATIENT
Start: 2017-10-05 | End: 2017-10-05

## 2017-10-05 RX ORDER — OXYCODONE HYDROCHLORIDE 5 MG/1
TABLET ORAL
Status: COMPLETED
Start: 2017-10-05 | End: 2017-10-05

## 2017-10-05 RX ORDER — SODIUM CHLORIDE 9 MG/ML
500 INJECTION, SOLUTION INTRAVENOUS
Status: DISPENSED | OUTPATIENT
Start: 2017-10-05 | End: 2017-10-05

## 2017-10-05 RX ORDER — OXYCODONE HYDROCHLORIDE 5 MG/1
5 TABLET ORAL
Status: DISCONTINUED | OUTPATIENT
Start: 2017-10-05 | End: 2017-10-05 | Stop reason: HOSPADM

## 2017-10-05 RX ORDER — ONDANSETRON 2 MG/ML
4 INJECTION INTRAMUSCULAR; INTRAVENOUS EVERY 8 HOURS PRN
Status: DISCONTINUED | OUTPATIENT
Start: 2017-10-05 | End: 2017-10-05 | Stop reason: HOSPADM

## 2017-10-05 RX ORDER — OXYCODONE HYDROCHLORIDE 5 MG/1
10 TABLET ORAL
Status: DISCONTINUED | OUTPATIENT
Start: 2017-10-05 | End: 2017-10-05 | Stop reason: HOSPADM

## 2017-10-05 RX ORDER — DIPHENHYDRAMINE HYDROCHLORIDE 50 MG/ML
50 INJECTION INTRAMUSCULAR; INTRAVENOUS ONCE
Status: COMPLETED | OUTPATIENT
Start: 2017-10-05 | End: 2017-10-05

## 2017-10-05 RX ORDER — MIDAZOLAM HYDROCHLORIDE 1 MG/ML
.5-2 INJECTION INTRAMUSCULAR; INTRAVENOUS PRN
Status: ACTIVE | OUTPATIENT
Start: 2017-10-05 | End: 2017-10-05

## 2017-10-05 RX ORDER — DIPHENHYDRAMINE HYDROCHLORIDE 50 MG/ML
INJECTION INTRAMUSCULAR; INTRAVENOUS
Status: COMPLETED
Start: 2017-10-05 | End: 2017-10-05

## 2017-10-05 RX ORDER — CEFTRIAXONE 1 G/1
INJECTION, POWDER, FOR SOLUTION INTRAMUSCULAR; INTRAVENOUS
Status: COMPLETED
Start: 2017-10-05 | End: 2017-10-05

## 2017-10-05 RX ORDER — MORPHINE SULFATE 4 MG/ML
4 INJECTION, SOLUTION INTRAMUSCULAR; INTRAVENOUS
Status: DISCONTINUED | OUTPATIENT
Start: 2017-10-05 | End: 2017-10-05 | Stop reason: HOSPADM

## 2017-10-05 RX ADMIN — OXYCODONE HYDROCHLORIDE 5 MG: 5 TABLET ORAL at 11:15

## 2017-10-05 RX ADMIN — FENTANYL CITRATE 25 MCG: 50 INJECTION, SOLUTION INTRAMUSCULAR; INTRAVENOUS at 09:12

## 2017-10-05 RX ADMIN — MIDAZOLAM 1 MG: 1 INJECTION INTRAMUSCULAR; INTRAVENOUS at 09:12

## 2017-10-05 RX ADMIN — MIDAZOLAM 1 MG: 1 INJECTION INTRAMUSCULAR; INTRAVENOUS at 08:57

## 2017-10-05 RX ADMIN — FENTANYL CITRATE 50 MCG: 50 INJECTION, SOLUTION INTRAMUSCULAR; INTRAVENOUS at 08:43

## 2017-10-05 RX ADMIN — FENTANYL CITRATE 50 MCG: 50 INJECTION, SOLUTION INTRAMUSCULAR; INTRAVENOUS at 08:47

## 2017-10-05 RX ADMIN — DIPHENHYDRAMINE HYDROCHLORIDE 50 MG: 50 INJECTION INTRAMUSCULAR; INTRAVENOUS at 09:04

## 2017-10-05 RX ADMIN — MIDAZOLAM 1 MG: 1 INJECTION INTRAMUSCULAR; INTRAVENOUS at 08:43

## 2017-10-05 RX ADMIN — FENTANYL CITRATE 25 MCG: 50 INJECTION, SOLUTION INTRAMUSCULAR; INTRAVENOUS at 08:53

## 2017-10-05 RX ADMIN — CEFTRIAXONE SODIUM 1000 MG: 1 INJECTION, POWDER, FOR SOLUTION INTRAMUSCULAR; INTRAVENOUS at 08:41

## 2017-10-05 RX ADMIN — FENTANYL CITRATE 25 MCG: 50 INJECTION, SOLUTION INTRAMUSCULAR; INTRAVENOUS at 09:35

## 2017-10-05 RX ADMIN — SODIUM CHLORIDE: 9 INJECTION, SOLUTION INTRAVENOUS at 08:30

## 2017-10-05 RX ADMIN — FENTANYL CITRATE 25 MCG: 50 INJECTION, SOLUTION INTRAMUSCULAR; INTRAVENOUS at 08:57

## 2017-10-05 RX ADMIN — MIDAZOLAM 1 MG: 1 INJECTION INTRAMUSCULAR; INTRAVENOUS at 09:35

## 2017-10-05 RX ADMIN — MIDAZOLAM 1 MG: 1 INJECTION INTRAMUSCULAR; INTRAVENOUS at 08:53

## 2017-10-05 RX ADMIN — MIDAZOLAM 1 MG: 1 INJECTION INTRAMUSCULAR; INTRAVENOUS at 08:47

## 2017-10-05 ASSESSMENT — PAIN SCALES - GENERAL
PAINLEVEL_OUTOF10: 0
PAINLEVEL_OUTOF10: 5
PAINLEVEL_OUTOF10: 0
PAINLEVEL_OUTOF10: 3
PAINLEVEL_OUTOF10: 3
PAINLEVEL_OUTOF10: 0
PAINLEVEL_OUTOF10: 0
PAINLEVEL_OUTOF10: 1

## 2017-10-05 NOTE — OR SURGEON
Immediate Post- Operative Note        PostOp Diagnosis: R lobe HCC s/p R lobe SIRT.       Procedure(s): R love PV embolization for L lobe FLR hypertrophy.       Estimated Blood Loss: Less than 50 ml        Complications: None            10/5/2017     0940 AM     Alli Navas

## 2017-10-05 NOTE — OR NURSING
Patient refused PIV w/o ultrasound due to scarring and medical hx. IR notified. IR RN to place PIV via ultrasound.

## 2017-10-05 NOTE — PROGRESS NOTES
Report received and Pt admitted to [facility] 4 s/p portal vein embolism. Site CDI with no s/s of bleeding or hematoma. Pt attached to all leads and monitors. VSS with no complaints of pain or nausea. Fluids and food offered. Orders reviewed.

## 2017-10-05 NOTE — PROGRESS NOTES
Pt. embolization site CDI with no s/s of bleeding or hematoma. Pt. Meets discharge criteria. Pt. And responsible adult given discharge instructions along with Rx's. Pt. And responsible adult educated and all questions answered. Signed copy on chart. All lines and drains DC'd. Pt. Belongings with Pt and Pt. Transported via wheel chair to car with escort.

## 2017-10-05 NOTE — PROGRESS NOTES
Pt underwent a portal vein embolization performed by Dr Navas. Pt remained hemodynamically stable throughout the procedure. No adverse reaction noted. Bandage to [facility] procedure site cdi. Report to MAYRA Gardner. Pt taken back to PPU in stable condition.     All mirospheres placed in portal vein:  [employer] Embozene Color-advanced microspheres, 250 micrometer x 2mL, ref [id number], lot# [id number]  [employer] Emb[employer]ene Color-advanced microspheres, 250 micrometer x 2mL, ref [id number], lot# [id number]  [place] [employer] Embozene Color-advanced microspheres, 500 micrometer x 2mL, ref [id number], lot# [id number]  [place] [employer] Emb[employer]ene Color-advanced microspheres, 700 micrometer x 2mL, ref [id number], lot# [id number]

## 2017-10-05 NOTE — DISCHARGE INSTRUCTIONS
ACTIVITY: Rest and take it easy for the first 24 hours.  A responsible adult is recommended to remain with you during that time.  It is normal to feel sleepy.  We encourage you to not do anything that requires balance, judgment or coordination.    MILD FLU-LIKE SYMPTOMS ARE NORMAL. YOU MAY EXPERIENCE GENERALIZED MUSCLE ACHES, THROAT IRRITATION, HEADACHE AND/OR SOME NAUSEA.    FOR 24 HOURS DO NOT:  Drive, operate machinery or run household appliances.  Drink beer or alcoholic beverages.   Make important decisions or sign legal documents.    SPECIAL INSTRUCTIONS: Refer to embolization information.    DIET: To avoid nausea, slowly advance diet as tolerated, avoiding spicy or greasy foods for the first day.  Add more substantial food to your diet according to your physician's instructions.  Babies can be fed formula or breast milk as soon as they are hungry.  INCREASE FLUIDS AND FIBER TO AVOID CONSTIPATION.    SURGICAL DRESSING/BATHING: May remove dressing in 48 hours. May shower in 48 hours. Do not submerge site for 1 week.     FOLLOW-UP APPOINTMENT:  A follow-up appointment should be arranged with your doctor; call to schedule.    You should CALL YOUR PHYSICIAN if you develop:  Fever greater than 101 degrees F.  Pain not relieved by medication, or persistent nausea or vomiting.  Excessive bleeding (blood soaking through dressing) or unexpected drainage from the wound.  Extreme redness or swelling around the incision site, drainage of pus or foul smelling drainage.  Inability to urinate or empty your bladder within 8 hours.  Problems with breathing or chest pain.    You should call 911 if you develop problems with breathing or chest pain.  If you are unable to contact your doctor or surgical center, you should go to the nearest emergency room or urgent care center.  Physician's telephone #: [phone]    If any questions arise, call your doctor.  If your doctor is not available, please feel free to call the Surgical  Center at [phone].  The Center is open Monday through Friday from 7AM to 7PM.  You can also call the HEALTH HOTLINE open 24 hours/day, 7 days/week and speak to a nurse at [phone], or toll free at [phone].    A registered nurse may call you a few days after your surgery to see how you are doing after your procedure.    MEDICATIONS: Resume taking daily medication.  Take prescribed pain medication with food.  If no medication is prescribed, you may take non-aspirin pain medication if needed.  PAIN MEDICATION CAN BE VERY CONSTIPATING.  Take a stool softener or laxative such as senokot, pericolace, or milk of magnesia if needed.    Prescription given for antibiotics and pain.  Last pain medication given at 1115.    If your physician has prescribed pain medication that includes Acetaminophen (Tylenol), do not take additional Acetaminophen (Tylenol) while taking the prescribed medication.    Depression / Suicide Risk    As you are discharged from this [facility] facility, it is important to learn how to keep safe from harming yourself.    Recognize the warning signs:  · Abrupt changes in personality, positive or negative- including increase in energy   · Giving away possessions  · Change in eating patterns- significant weight changes-  positive or negative  · Change in sleeping patterns- unable to sleep or sleeping all the time   · Unwillingness or inability to communicate  · Depression  · Unusual sadness, discouragement and loneliness  · Talk of wanting to die  · Neglect of personal appearance   · Rebelliousness- reckless behavior  · Withdrawal from people/activities they love  · Confusion- inability to concentrate     If you or a loved one observes any of these behaviors or has concerns about self-harm, here's what you can do:  · Talk about it- your feelings and reasons for harming yourself  · Remove any means that you might use to hurt yourself (examples: pills, rope, extension cords, firearm)  · Get  professional help from the community (Mental Health, Substance Abuse, psychological counseling)  · Do not be alone:Call your Safe Contact- someone whom you trust who will be there for you.  · Call your local CRISIS HOTLINE 283-8081 or 694-343-0337  · Call your local Children's Mobile Crisis Response Team [place] (942) 067-0815 or www.[employer]  · Call the toll free National Suicide Prevention Hotlines   · National Suicide Prevention Lifeline 021-809-CVNJ (0040)  · [employer] Hope Line Network 800-SUICIDE (625-1210)    Embolization, Care After  Refer to this sheet in the next few weeks. These instructions provide you with information on caring for yourself after your procedure. Your health care provider may also give you more specific instructions. Your treatment has been planned according to current medical practices, but problems sometimes occur. Call your health care provider if you have any problems or questions after your procedure.  WHAT TO EXPECT AFTER THE PROCEDURE  After your procedure, it is typical to have the following:   · Pain, swelling, or bruising at the puncture site.  · Headache.  · Loss of appetite, nausea, or vomiting.  · Fever.  HOME CARE INSTRUCTIONS   · Take medicine only as directed by your health care provider.  · You can eat what you usually do, but be sure to include lots of fruits, vegetables, and whole grains in your diet. This helps prevent constipation.  · Drink enough fluids to keep your urine clear or pale yellow.  · Take two 10-minute walks each day.  · Do not lift anything heavier than 10 pounds for 1 week after the procedure.  · Do not drive for 24 hours after the procedure.  · Do not take a bath for 5 days after the procedure. It is okay to take a shower.  · You may be able to get back to all your normal activities within 1 week after your procedure. Ask your health care provider when you can return to sexual activity.  · Keep all your follow-up appointments.  SEEK  MEDICAL CARE IF:  · Your pain medicine is not helping.  · You have a fever.  · You have nausea or vomiting.  · You have new bruising or swelling in your groin.  SEEK IMMEDIATE MEDICAL CARE IF:  · You have a fever or persistent symptoms for longer than 3 days.  · You have pain in your legs.  · You develop swelling and discoloration in your legs.  · Your legs become pale and cold or blue.  · You develop shortness of breath, feel faint, or pass out.  · You have chest pain or trouble breathing.  · You develop a cough or cough up blood.  · You develop a rash.  · You have side effects from your medicine.  · You feel weak or have trouble moving your arms or legs.  · You have balance problems.  · You have speech or vision problems.     This information is not intended to replace advice given to you by your health care provider. Make sure you discuss any questions you have with your health care provider.     Document Released: 12/23/2014 Document Reviewed: 12/23/2014  [employer] Interactive Patient Education ©2016 [employer]

## 2017-10-30 ENCOUNTER — PATIENT OUTREACH (OUTPATIENT)
Dept: OTHER | Facility: MEDICAL CENTER | Age: 62
End: 2017-10-30

## 2017-10-30 ENCOUNTER — HOSPITAL ENCOUNTER (OUTPATIENT)
Dept: RADIOLOGY | Facility: MEDICAL CENTER | Age: 62
End: 2017-10-30
Attending: NURSE PRACTITIONER

## 2017-10-30 DIAGNOSIS — R10.9 ABDOMINAL PAIN, UNSPECIFIED ABDOMINAL LOCATION: ICD-10-CM

## 2017-10-30 NOTE — CONSULTS
Mauricio [facility] was seen today S/p uncomplicated right hepatic lobe Y90 SIRT (August 15, 2017) and right portal vein embolization (October 5, 2017) by Alli Navas MD, referred by Ba Coleman MD. Discharged to home on October 5 with Rx for Tramadol 50 mg po q4 hours prn post procedure pain, #30, no RF by Dr. Navas.  He contacted our office for a refill of this medication due to ongoing post procedure pain.    Today, the patient describes ongoing abdominal pain since the portal vein embolization. It was the worst starting about a week after the procedure. He did not like the effects of oxycodone that was provided after the Y90 SIRT procedure and he requested tramadol for the PVE, which he states worked very well for him with the only notable side effect being insomnia. He has tried taking it in the morning and the evening but still has difficulty sleeping when he takes it. He has not tried OTC medications. He takes at least one tramadol daily for his post procedure pain. He denies fevers. His other complaint today is an infected cyst on his back and he is in [place] for an appointment for this problem and will start on an unknown antibiotic from another provider. He has not yet followed up with Dr. Coleman.     It is reasonable to expect the patient to have ongoing abdominal discomfort after his 2 right liver interventional procedures. [facility] query not pulling his report at the time of visit despite IT assistance but pt denies getting RX from any other providers since last seen by Dr. Navas. Refilled tramadol 50 mg po q4hrs prn post procedure pain, #30 no RF. Paper RX with patient. Side effects discussed. We discussed options including OTC ibuprofen to help with the inflammatory component of his pain. He asked about Tylenol #4, which he has not taken, and we discussed SE of codeine including constipation and vomiting. He elects to continue with the tramadol rather than change narcotic medications  and has agreed to try ibuprofen. He was put in contact with Cira Padilla RN Navigator for hepatobiliary cancer and he walked to the office of Dr. Coleman to schedule his follow up appointment.

## 2017-11-20 ENCOUNTER — HOSPITAL ENCOUNTER (OUTPATIENT)
Dept: RADIOLOGY | Facility: MEDICAL CENTER | Age: 62
End: 2017-11-20
Attending: SURGERY
Payer: COMMERCIAL

## 2017-11-20 DIAGNOSIS — C22.9 MALIGNANT NEOPLASM OF LIVER, UNSPECIFIED LIVER MALIGNANCY TYPE (HCC): ICD-10-CM

## 2017-11-20 PROCEDURE — 700117 HCHG RX CONTRAST REV CODE 255: Performed by: SURGERY

## 2017-11-20 PROCEDURE — 74170 CT ABD WO CNTRST FLWD CNTRST: CPT

## 2017-11-20 RX ADMIN — IOHEXOL 100 ML: 350 INJECTION, SOLUTION INTRAVENOUS at 15:15

## 2017-11-28 ENCOUNTER — HOSPITAL ENCOUNTER (OUTPATIENT)
Dept: LAB | Facility: MEDICAL CENTER | Age: 62
End: 2017-11-28
Attending: SURGERY
Payer: COMMERCIAL

## 2017-11-28 LAB
ALBUMIN SERPL BCP-MCNC: 3 G/DL (ref 3.2–4.9)
ALBUMIN/GLOB SERPL: 0.8 G/DL
ALP SERPL-CCNC: 357 U/L (ref 30–99)
ALT SERPL-CCNC: 132 U/L (ref 2–50)
ANION GAP SERPL CALC-SCNC: 9 MMOL/L (ref 0–11.9)
ANISOCYTOSIS BLD QL SMEAR: ABNORMAL
AST SERPL-CCNC: 197 U/L (ref 12–45)
BASOPHILS # BLD AUTO: 0.9 % (ref 0–1.8)
BASOPHILS # BLD: 0.09 K/UL (ref 0–0.12)
BILIRUB SERPL-MCNC: 10.2 MG/DL (ref 0.1–1.5)
BUN SERPL-MCNC: 16 MG/DL (ref 8–22)
CALCIUM SERPL-MCNC: 8.9 MG/DL (ref 8.5–10.5)
CHLORIDE SERPL-SCNC: 102 MMOL/L (ref 96–112)
CO2 SERPL-SCNC: 20 MMOL/L (ref 20–33)
CREAT SERPL-MCNC: 0.58 MG/DL (ref 0.5–1.4)
EOSINOPHIL # BLD AUTO: 0 K/UL (ref 0–0.51)
EOSINOPHIL NFR BLD: 0 % (ref 0–6.9)
ERYTHROCYTE [DISTWIDTH] IN BLOOD BY AUTOMATED COUNT: 52.7 FL (ref 35.9–50)
GFR SERPL CREATININE-BSD FRML MDRD: >60 ML/MIN/1.73 M 2
GLOBULIN SER CALC-MCNC: 3.7 G/DL (ref 1.9–3.5)
GLUCOSE SERPL-MCNC: 92 MG/DL (ref 65–99)
HCT VFR BLD AUTO: 38.4 % (ref 42–52)
HGB BLD-MCNC: 13.1 G/DL (ref 14–18)
INR PPP: 1.13 (ref 0.87–1.13)
LG PLATELETS BLD QL SMEAR: NORMAL
LYMPHOCYTES # BLD AUTO: 0.86 K/UL (ref 1–4.8)
LYMPHOCYTES NFR BLD: 8.4 % (ref 22–41)
MACROCYTES BLD QL SMEAR: ABNORMAL
MANUAL DIFF BLD: NORMAL
MCH RBC QN AUTO: 31.5 PG (ref 27–33)
MCHC RBC AUTO-ENTMCNC: 34.1 G/DL (ref 33.7–35.3)
MCV RBC AUTO: 92.3 FL (ref 81.4–97.8)
METAMYELOCYTES NFR BLD MANUAL: 0.8 %
MONOCYTES # BLD AUTO: 1.11 K/UL (ref 0–0.85)
MONOCYTES NFR BLD AUTO: 10.9 % (ref 0–13.4)
MORPHOLOGY BLD-IMP: NORMAL
MYELOCYTES NFR BLD MANUAL: 1.7 %
NEUTROPHILS # BLD AUTO: 7.88 K/UL (ref 1.82–7.42)
NEUTROPHILS NFR BLD: 77.3 % (ref 44–72)
NRBC # BLD AUTO: 0 K/UL
NRBC BLD AUTO-RTO: 0 /100 WBC
PHOSPHATE SERPL-MCNC: 3.2 MG/DL (ref 2.5–4.5)
PLATELET # BLD AUTO: 267 K/UL (ref 164–446)
PLATELET BLD QL SMEAR: NORMAL
PMV BLD AUTO: 11.8 FL (ref 9–12.9)
POIKILOCYTOSIS BLD QL SMEAR: NORMAL
POTASSIUM SERPL-SCNC: 4.2 MMOL/L (ref 3.6–5.5)
PROT SERPL-MCNC: 6.7 G/DL (ref 6–8.2)
PROTHROMBIN TIME: 14.2 SEC (ref 12–14.6)
RBC # BLD AUTO: 4.16 M/UL (ref 4.7–6.1)
RBC BLD AUTO: PRESENT
SODIUM SERPL-SCNC: 131 MMOL/L (ref 135–145)
TARGETS BLD QL SMEAR: NORMAL
WBC # BLD AUTO: 10.2 K/UL (ref 4.8–10.8)

## 2017-11-28 PROCEDURE — 80053 COMPREHEN METABOLIC PANEL: CPT

## 2017-11-28 PROCEDURE — 82105 ALPHA-FETOPROTEIN SERUM: CPT

## 2017-11-28 PROCEDURE — 36415 COLL VENOUS BLD VENIPUNCTURE: CPT

## 2017-11-28 PROCEDURE — 85007 BL SMEAR W/DIFF WBC COUNT: CPT

## 2017-11-28 PROCEDURE — 85027 COMPLETE CBC AUTOMATED: CPT

## 2017-11-28 PROCEDURE — 84100 ASSAY OF PHOSPHORUS: CPT

## 2017-11-28 PROCEDURE — 85610 PROTHROMBIN TIME: CPT

## 2017-11-29 LAB — AFP-TM SERPL-MCNC: 292 NG/ML (ref 0–9)

## 2017-12-05 ENCOUNTER — TELEPHONE (OUTPATIENT)
Dept: RADIOLOGY | Facility: MEDICAL CENTER | Age: 62
End: 2017-12-05

## 2017-12-05 NOTE — TELEPHONE ENCOUNTER
Pt contacted APRN requesting DOS for IR procedures for paperwork. Feels OK today, has hospice eval scheduled for advanced liver CA.

## 2017-12-07 ENCOUNTER — PATIENT OUTREACH (OUTPATIENT)
Dept: OTHER | Facility: MEDICAL CENTER | Age: 62
End: 2017-12-07

## (undated) DEVICE — GLOVE BIOGEL SZ 6.5 SURGICAL PF LTX (50PR/BX 4BX/CA)

## (undated) DEVICE — STAPLER SKIN DISP - (6/BX 10BX/CA) VISISTAT

## (undated) DEVICE — TROCAR 5X100 NON BLADED Z-TH - READ KII (6/BX)

## (undated) DEVICE — SUTURE GENERAL

## (undated) DEVICE — SENSOR SPO2 NEO LNCS ADHESIVE (20/BX) SEE USER NOTES

## (undated) DEVICE — GLOVE BIOGEL SZ 8 SURGICAL PF LTX - (50PR/BX 4BX/CA)

## (undated) DEVICE — SPONGE GAUZESTER. 2X2 4-PL - (2/PK 50PK/BX 30BX/CS)

## (undated) DEVICE — GOWN WARMING STANDARD FLEX - (30/CA)

## (undated) DEVICE — SUTURE 0 VICRYL PLUS UR-6 - 27 INCH (36/BX)

## (undated) DEVICE — TUBE CONNECT SUCTION CLEAR 120 X 1/4" (50EA/CA)"

## (undated) DEVICE — ELECTRODE DUAL RETURN W/ CORD - (50/PK)

## (undated) DEVICE — SET SUCTION/IRRIGATION WITH DISPOSABLE TIP (6/CA )PART #0250-070-520 IS A SUB

## (undated) DEVICE — SUCTION INSTRUMENT YANKAUER BULBOUS TIP W/O VENT (50EA/CA)

## (undated) DEVICE — GOWN SURGICAL X-LARGE ULTRA - FILM-REINFORCED (20/CA)

## (undated) DEVICE — CANISTER SUCTION 3000ML MECHANICAL FILTER AUTO SHUTOFF MEDI-VAC NONSTERILE LF DISP  (40EA/CA)

## (undated) DEVICE — TUBING INSUFFLATION - (10/BX)

## (undated) DEVICE — TROCAR Z THREAD12MM OPTICAL - NON BLADED (6/BX)

## (undated) DEVICE — LACTATED RINGERS INJ 1000 ML - (14EA/CA 60CA/PF)

## (undated) DEVICE — SET LEADWIRE 5 LEAD BEDSIDE DISPOSABLE ECG (1SET OF 5/EA)

## (undated) DEVICE — PACK LAP CHOLE OR - (2EA/CA)

## (undated) DEVICE — Device

## (undated) DEVICE — SODIUM CHL IRRIGATION 0.9% 1000ML (12EA/CA)

## (undated) DEVICE — CHLORAPREP 26 ML APPLICATOR - ORANGE TINT(25/CA)

## (undated) DEVICE — BLADE SURGICAL CLIPPER - (50EA/CA)

## (undated) DEVICE — TUBE E-T HI-LO CUFF 7.5MM (10EA/PK)

## (undated) DEVICE — DRAPESURG STERI-DRAPE LONG - (10/BX 4BX/CA)

## (undated) DEVICE — KIT ANESTHESIA W/CIRCUIT & 3/LT BAG W/FILTER (20EA/CA)

## (undated) DEVICE — DRAPE IOBAN II INCISE 23X17 - (10EA/BX 4BX/CA)

## (undated) DEVICE — PROTECTOR ULNA NERVE - (36PR/CA)

## (undated) DEVICE — GLOVE BIOGEL PI INDICATOR SZ 6.5 SURGICAL PF LF - (50/BX 4BX/CA)

## (undated) DEVICE — SET EXTENSION WITH 2 PORTS (48EA/CA) ***PART #2C8610 IS A SUBSTITUTE*****

## (undated) DEVICE — KIT ROOM DECONTAMINATION

## (undated) DEVICE — SLEEVE, VASO, THIGH, MED

## (undated) DEVICE — MASK ANESTHESIA ADULT  - (100/CA)

## (undated) DEVICE — DRESSING TRANSPARENT FILM TEGADERM 2.375 X 2.75"  (100EA/BX)"

## (undated) DEVICE — LEAD SET 6 DISP. EKG NIHON KOHDEN

## (undated) DEVICE — ELECTRODE 850 FOAM ADHESIVE - HYDROGEL RADIOTRNSPRNT (50/PK)

## (undated) DEVICE — TUBING CLEARLINK DUO-VENT - C-FLO (48EA/CA)

## (undated) DEVICE — NEPTUNE 4 PORT MANIFOLD - (20/PK)

## (undated) DEVICE — HEAD HOLDER JUNIOR/ADULT

## (undated) DEVICE — GLOVE BIOGEL PI ORTHO SZ 7.5 PF LF (40PR/BX)